# Patient Record
Sex: FEMALE | Race: BLACK OR AFRICAN AMERICAN | NOT HISPANIC OR LATINO | URBAN - METROPOLITAN AREA
[De-identification: names, ages, dates, MRNs, and addresses within clinical notes are randomized per-mention and may not be internally consistent; named-entity substitution may affect disease eponyms.]

---

## 2023-09-01 ENCOUNTER — APPOINTMENT (OUTPATIENT)
Dept: PRIMARY CARE | Facility: CLINIC | Age: 63
End: 2023-09-01
Payer: COMMERCIAL

## 2023-12-12 ENCOUNTER — APPOINTMENT (OUTPATIENT)
Dept: PRIMARY CARE | Facility: CLINIC | Age: 63
End: 2023-12-12
Payer: COMMERCIAL

## 2024-02-28 ENCOUNTER — CLINICAL SUPPORT (OUTPATIENT)
Dept: EMERGENCY MEDICINE | Facility: HOSPITAL | Age: 64
End: 2024-02-28
Payer: COMMERCIAL

## 2024-02-28 ENCOUNTER — APPOINTMENT (OUTPATIENT)
Dept: RADIOLOGY | Facility: HOSPITAL | Age: 64
End: 2024-02-28
Payer: COMMERCIAL

## 2024-02-28 ENCOUNTER — HOSPITAL ENCOUNTER (EMERGENCY)
Facility: HOSPITAL | Age: 64
Discharge: HOME | End: 2024-02-28
Attending: EMERGENCY MEDICINE
Payer: COMMERCIAL

## 2024-02-28 VITALS
HEART RATE: 87 BPM | TEMPERATURE: 97.4 F | RESPIRATION RATE: 20 BRPM | BODY MASS INDEX: 35.49 KG/M2 | SYSTOLIC BLOOD PRESSURE: 156 MMHG | WEIGHT: 262 LBS | OXYGEN SATURATION: 98 % | HEIGHT: 72 IN | DIASTOLIC BLOOD PRESSURE: 72 MMHG

## 2024-02-28 DIAGNOSIS — R21 RASH: Primary | ICD-10-CM

## 2024-02-28 DIAGNOSIS — J44.9 CHRONIC OBSTRUCTIVE PULMONARY DISEASE, UNSPECIFIED COPD TYPE (MULTI): ICD-10-CM

## 2024-02-28 LAB
ALBUMIN SERPL BCP-MCNC: 4.1 G/DL (ref 3.4–5)
ALP SERPL-CCNC: 68 U/L (ref 33–136)
ALT SERPL W P-5'-P-CCNC: 12 U/L (ref 7–45)
AMPHETAMINES UR QL SCN: NORMAL
ANION GAP BLDV CALCULATED.4IONS-SCNC: 9 MMOL/L (ref 10–25)
ANION GAP SERPL CALC-SCNC: 18 MMOL/L (ref 10–20)
APAP SERPL-MCNC: <10 UG/ML
APPEARANCE UR: CLEAR
AST SERPL W P-5'-P-CCNC: 11 U/L (ref 9–39)
ATRIAL RATE: 82 BPM
BARBITURATES UR QL SCN: NORMAL
BASE EXCESS BLDV CALC-SCNC: 6.5 MMOL/L (ref -2–3)
BASOPHILS # BLD AUTO: 0.03 X10*3/UL (ref 0–0.1)
BASOPHILS NFR BLD AUTO: 0.7 %
BENZODIAZ UR QL SCN: NORMAL
BILIRUB SERPL-MCNC: 0.7 MG/DL (ref 0–1.2)
BILIRUB UR STRIP.AUTO-MCNC: NEGATIVE MG/DL
BODY TEMPERATURE: 37 DEGREES CELSIUS
BUN SERPL-MCNC: 15 MG/DL (ref 6–23)
BZE UR QL SCN: NORMAL
CA-I BLDV-SCNC: 1.11 MMOL/L (ref 1.1–1.33)
CALCIUM SERPL-MCNC: 9.4 MG/DL (ref 8.6–10.6)
CANNABINOIDS UR QL SCN: NORMAL
CARDIAC TROPONIN I PNL SERPL HS: 13 NG/L (ref 0–34)
CHLORIDE BLDV-SCNC: 100 MMOL/L (ref 98–107)
CHLORIDE SERPL-SCNC: 100 MMOL/L (ref 98–107)
CO2 SERPL-SCNC: 28 MMOL/L (ref 21–32)
COLOR UR: ABNORMAL
CREAT SERPL-MCNC: 0.8 MG/DL (ref 0.5–1.05)
EGFRCR SERPLBLD CKD-EPI 2021: 82 ML/MIN/1.73M*2
EOSINOPHIL # BLD AUTO: 0.12 X10*3/UL (ref 0–0.7)
EOSINOPHIL NFR BLD AUTO: 2.6 %
ERYTHROCYTE [DISTWIDTH] IN BLOOD BY AUTOMATED COUNT: 13.8 % (ref 11.5–14.5)
ETHANOL SERPL-MCNC: <10 MG/DL
FENTANYL+NORFENTANYL UR QL SCN: NORMAL
FLUAV RNA RESP QL NAA+PROBE: NOT DETECTED
FLUBV RNA RESP QL NAA+PROBE: NOT DETECTED
GLUCOSE BLDV-MCNC: 116 MG/DL (ref 74–99)
GLUCOSE SERPL-MCNC: 100 MG/DL (ref 74–99)
GLUCOSE UR STRIP.AUTO-MCNC: NORMAL MG/DL
HCO3 BLDV-SCNC: 31.9 MMOL/L (ref 22–26)
HCT VFR BLD AUTO: 45.9 % (ref 36–46)
HCT VFR BLD EST: 48 % (ref 36–46)
HGB BLD-MCNC: 15.4 G/DL (ref 12–16)
HGB BLDV-MCNC: 16 G/DL (ref 12–16)
HOLD SPECIMEN: NORMAL
IMM GRANULOCYTES # BLD AUTO: 0.03 X10*3/UL (ref 0–0.7)
IMM GRANULOCYTES NFR BLD AUTO: 0.7 % (ref 0–0.9)
KETONES UR STRIP.AUTO-MCNC: NEGATIVE MG/DL
LACTATE BLDV-SCNC: 2.2 MMOL/L (ref 0.4–2)
LEUKOCYTE ESTERASE UR QL STRIP.AUTO: NEGATIVE
LYMPHOCYTES # BLD AUTO: 1.6 X10*3/UL (ref 1.2–4.8)
LYMPHOCYTES NFR BLD AUTO: 34.8 %
MCH RBC QN AUTO: 29.3 PG (ref 26–34)
MCHC RBC AUTO-ENTMCNC: 33.6 G/DL (ref 32–36)
MCV RBC AUTO: 87 FL (ref 80–100)
MONOCYTES # BLD AUTO: 0.45 X10*3/UL (ref 0.1–1)
MONOCYTES NFR BLD AUTO: 9.8 %
NEUTROPHILS # BLD AUTO: 2.37 X10*3/UL (ref 1.2–7.7)
NEUTROPHILS NFR BLD AUTO: 51.4 %
NITRITE UR QL STRIP.AUTO: NEGATIVE
NRBC BLD-RTO: 0 /100 WBCS (ref 0–0)
OPIATES UR QL SCN: NORMAL
OXYCODONE+OXYMORPHONE UR QL SCN: NORMAL
OXYHGB MFR BLDV: 80.2 % (ref 45–75)
P AXIS: 65 DEGREES
P OFFSET: 200 MS
P ONSET: 142 MS
PCO2 BLDV: 47 MM HG (ref 41–51)
PCP UR QL SCN: NORMAL
PH BLDV: 7.44 PH (ref 7.33–7.43)
PH UR STRIP.AUTO: 6.5 [PH]
PLATELET # BLD AUTO: 204 X10*3/UL (ref 150–450)
PO2 BLDV: 55 MM HG (ref 35–45)
POTASSIUM BLDV-SCNC: 3.6 MMOL/L (ref 3.5–5.3)
POTASSIUM SERPL-SCNC: 3.8 MMOL/L (ref 3.5–5.3)
PR INTERVAL: 168 MS
PROT SERPL-MCNC: 6.5 G/DL (ref 6.4–8.2)
PROT UR STRIP.AUTO-MCNC: ABNORMAL MG/DL
Q ONSET: 226 MS
QRS COUNT: 14 BEATS
QRS DURATION: 90 MS
QT INTERVAL: 410 MS
QTC CALCULATION(BAZETT): 479 MS
QTC FREDERICIA: 455 MS
R AXIS: -11 DEGREES
RBC # BLD AUTO: 5.25 X10*6/UL (ref 4–5.2)
RBC # UR STRIP.AUTO: NEGATIVE /UL
RBC #/AREA URNS AUTO: NORMAL /HPF
SALICYLATES SERPL-MCNC: <3 MG/DL
SAO2 % BLDV: 86 % (ref 45–75)
SARS-COV-2 RNA RESP QL NAA+PROBE: NOT DETECTED
SODIUM BLDV-SCNC: 137 MMOL/L (ref 136–145)
SODIUM SERPL-SCNC: 142 MMOL/L (ref 136–145)
SP GR UR STRIP.AUTO: 1.01
SQUAMOUS #/AREA URNS AUTO: NORMAL /HPF
T AXIS: 29 DEGREES
T OFFSET: 431 MS
UROBILINOGEN UR STRIP.AUTO-MCNC: NORMAL MG/DL
VENTRICULAR RATE: 82 BPM
WBC # BLD AUTO: 4.6 X10*3/UL (ref 4.4–11.3)
WBC #/AREA URNS AUTO: NORMAL /HPF

## 2024-02-28 PROCEDURE — 99222 1ST HOSP IP/OBS MODERATE 55: CPT

## 2024-02-28 PROCEDURE — 2500000004 HC RX 250 GENERAL PHARMACY W/ HCPCS (ALT 636 FOR OP/ED): Mod: SE

## 2024-02-28 PROCEDURE — 81001 URINALYSIS AUTO W/SCOPE: CPT | Mod: 59

## 2024-02-28 PROCEDURE — 84075 ASSAY ALKALINE PHOSPHATASE: CPT

## 2024-02-28 PROCEDURE — 93010 ELECTROCARDIOGRAM REPORT: CPT | Performed by: EMERGENCY MEDICINE

## 2024-02-28 PROCEDURE — 71045 X-RAY EXAM CHEST 1 VIEW: CPT | Mod: FOREIGN READ | Performed by: RADIOLOGY

## 2024-02-28 PROCEDURE — 71045 X-RAY EXAM CHEST 1 VIEW: CPT

## 2024-02-28 PROCEDURE — 85025 COMPLETE CBC W/AUTO DIFF WBC: CPT

## 2024-02-28 PROCEDURE — 87636 SARSCOV2 & INF A&B AMP PRB: CPT | Performed by: EMERGENCY MEDICINE

## 2024-02-28 PROCEDURE — 84132 ASSAY OF SERUM POTASSIUM: CPT

## 2024-02-28 PROCEDURE — 99285 EMERGENCY DEPT VISIT HI MDM: CPT

## 2024-02-28 PROCEDURE — 2500000001 HC RX 250 WO HCPCS SELF ADMINISTERED DRUGS (ALT 637 FOR MEDICARE OP): Mod: SE

## 2024-02-28 PROCEDURE — 80307 DRUG TEST PRSMV CHEM ANLYZR: CPT

## 2024-02-28 PROCEDURE — 99284 EMERGENCY DEPT VISIT MOD MDM: CPT | Mod: 25

## 2024-02-28 PROCEDURE — 93005 ELECTROCARDIOGRAM TRACING: CPT

## 2024-02-28 PROCEDURE — 84484 ASSAY OF TROPONIN QUANT: CPT | Performed by: EMERGENCY MEDICINE

## 2024-02-28 PROCEDURE — 36415 COLL VENOUS BLD VENIPUNCTURE: CPT

## 2024-02-28 PROCEDURE — 80143 DRUG ASSAY ACETAMINOPHEN: CPT

## 2024-02-28 RX ORDER — PREDNISONE 20 MG/1
40 TABLET ORAL ONCE
Status: COMPLETED | OUTPATIENT
Start: 2024-02-28 | End: 2024-02-28

## 2024-02-28 RX ORDER — TRIAMCINOLONE ACETONIDE 1 MG/G
1 CREAM TOPICAL 2 TIMES DAILY
Qty: 2 G | Refills: 0 | Status: SHIPPED | OUTPATIENT
Start: 2024-02-28 | End: 2024-03-06

## 2024-02-28 RX ORDER — ACETAMINOPHEN 325 MG/1
650 TABLET ORAL ONCE
Status: COMPLETED | OUTPATIENT
Start: 2024-02-28 | End: 2024-02-28

## 2024-02-28 RX ORDER — TRIAMCINOLONE ACETONIDE 1 MG/G
CREAM TOPICAL 2 TIMES DAILY
Status: DISCONTINUED | OUTPATIENT
Start: 2024-02-28 | End: 2024-02-28 | Stop reason: HOSPADM

## 2024-02-28 RX ADMIN — TRIAMCINOLONE ACETONIDE: 1 CREAM TOPICAL at 11:17

## 2024-02-28 RX ADMIN — PREDNISONE 40 MG: 20 TABLET ORAL at 11:17

## 2024-02-28 RX ADMIN — ACETAMINOPHEN 650 MG: 325 TABLET ORAL at 11:17

## 2024-02-28 ASSESSMENT — COLUMBIA-SUICIDE SEVERITY RATING SCALE - C-SSRS
1. IN THE PAST MONTH, HAVE YOU WISHED YOU WERE DEAD OR WISHED YOU COULD GO TO SLEEP AND NOT WAKE UP?: NO
6. HAVE YOU EVER DONE ANYTHING, STARTED TO DO ANYTHING, OR PREPARED TO DO ANYTHING TO END YOUR LIFE?: NO
2. HAVE YOU ACTUALLY HAD ANY THOUGHTS OF KILLING YOURSELF?: YES
5. HAVE YOU STARTED TO WORK OUT OR WORKED OUT THE DETAILS OF HOW TO KILL YOURSELF? DO YOU INTEND TO CARRY OUT THIS PLAN?: YES
4. HAVE YOU HAD THESE THOUGHTS AND HAD SOME INTENTION OF ACTING ON THEM?: YES

## 2024-02-28 ASSESSMENT — LIFESTYLE VARIABLES
EVER FELT BAD OR GUILTY ABOUT YOUR DRINKING: NO
HAVE PEOPLE ANNOYED YOU BY CRITICIZING YOUR DRINKING: NO
EVER HAD A DRINK FIRST THING IN THE MORNING TO STEADY YOUR NERVES TO GET RID OF A HANGOVER: NO
HAVE YOU EVER FELT YOU SHOULD CUT DOWN ON YOUR DRINKING: NO

## 2024-02-28 ASSESSMENT — PAIN DESCRIPTION - PROGRESSION: CLINICAL_PROGRESSION: NOT CHANGED

## 2024-02-28 ASSESSMENT — PAIN SCALES - GENERAL: PAINLEVEL_OUTOF10: 0 - NO PAIN

## 2024-02-28 ASSESSMENT — PAIN - FUNCTIONAL ASSESSMENT: PAIN_FUNCTIONAL_ASSESSMENT: 0-10

## 2024-02-28 NOTE — ED TRIAGE NOTES
"Pt presents to ED with chief complaint of \"feeling down.\" Pt has a Hx of schizophrenia and says she was feeling suicidal with the plan to \"bleed out.\" Pt did not act on this plan and says she feels better now. Pt denies HI at this time. Pt says she sometimes has AH/VH, but not currently. Pt would like her monthly \"psych shot\" early, and would then like to go home. Pt also has a medical history of COPD.  "

## 2024-02-28 NOTE — DISCHARGE INSTRUCTIONS
If you develop any new or worsening symptoms, please return to the emergency department immediately.  Please follow-up with your primary care provider and your other providers.  Please use your steroid cream for that rash.  If you feel like it is worsening please return to the emergency department.

## 2024-02-28 NOTE — ED PROVIDER NOTES
"HPI   Chief Complaint   Patient presents with    Psychiatric Evaluation     Pt presents to ED with chief complaint of \"feeling down.\" Pt has a Hx of schizophrenia and says she was feeling suicidal with the plan to \"bleed out.\" Pt did not act on this plan and says she feels better now. Pt denies HI at this time. Pt says she sometimes has AH/VH, but not currently. Pt would like her monthly \"psych shot\" early, and would then like to go home. Pt also has a medical history of COPD.       Patient is a 63-year-old female with history of schizophrenia and COPD who presents with complaint of \"feeling down\".  Patient states that she has been dealing with a rash on the right side of her neck, states that this has been going on for a day or 2, states this feels consistent with her eczema.  States that this rash has been itching, causing her to not get as much sleep.  Patient states due to other stressors and due to this, she had thoughts of suicide.  However, patient states that she only thought that because she does not like to be in discomfort.  States that she has gotten her antipsychotic shot as required.  Denies any audiovisual hallucinations.  Denies any homicidal ideations.  States that her plan was to \"bleed out\", but has not attempted this.  States that she would like to get medication for her rash and go home.  Also states that since she is here, she would like to get her shot that she is due for next week.  States she was given a breathing treatment while in the ambulance, because she stated that she felt like her COPD was acting out, but denies any shortness of breath currently.  Denies any chest pain, weakness, dizziness, nausea, vomiting abdominal pain, fevers, chills.                          [place] Coma Scale Score: 15                     Patient History   Past Medical History:   Diagnosis Date    Contact with and (suspected) exposure to infections with a predominantly sexual mode of transmission 04/03/2017    " Exposure to STD    Encounter for screening for infections with a predominantly sexual mode of transmission     Screening examination for STD (sexually transmitted disease)    Personal history of diseases of the skin and subcutaneous tissue 03/15/2016    History of folliculitis    Personal history of other diseases of the digestive system 01/16/2018    History of constipation    Personal history of other diseases of the nervous system and sense organs 07/07/2020    History of conjunctivitis    Personal history of other diseases of the respiratory system 02/16/2016    History of acute bronchitis    Personal history of other diseases of the respiratory system 03/06/2017    History of acute bronchitis    Personal history of other specified conditions 08/30/2018    History of diarrhea    Personal history of other specified conditions 03/23/2020    History of abdominal pain    Unspecified abdominal pain 10/12/2017    Abdominal wall pain     Past Surgical History:   Procedure Laterality Date    OTHER SURGICAL HISTORY  10/23/2017    Enterectomy     No family history on file.  Social History     Tobacco Use    Smoking status: Not on file    Smokeless tobacco: Not on file   Substance Use Topics    Alcohol use: Not on file    Drug use: Not on file       Physical Exam   ED Triage Vitals [02/28/24 1005]   Temperature Heart Rate Respirations BP   36.3 °C (97.4 °F) 76 20 162/74      Pulse Ox Temp Source Heart Rate Source Patient Position   98 % Oral -- --      BP Location FiO2 (%)     -- --       Physical Exam  Vitals and nursing note reviewed.   Constitutional:       Appearance: Normal appearance.   HENT:      Head: Normocephalic and atraumatic.      Right Ear: External ear normal.      Left Ear: External ear normal.      Nose: Nose normal.      Mouth/Throat:      Mouth: Mucous membranes are moist.      Pharynx: Oropharynx is clear.   Eyes:      Extraocular Movements: Extraocular movements intact.      Conjunctiva/sclera:  Conjunctivae normal.      Pupils: Pupils are equal, round, and reactive to light.   Cardiovascular:      Rate and Rhythm: Normal rate and regular rhythm.      Pulses: Normal pulses.      Heart sounds: Normal heart sounds.   Pulmonary:      Effort: Pulmonary effort is normal. No respiratory distress.      Breath sounds: Normal breath sounds. No wheezing.   Abdominal:      General: Abdomen is flat. Bowel sounds are normal.      Palpations: Abdomen is soft.      Tenderness: There is no abdominal tenderness. There is no guarding or rebound.   Musculoskeletal:         General: No deformity. Normal range of motion.      Cervical back: Normal range of motion and neck supple. No tenderness.      Right lower leg: No edema.      Left lower leg: No edema.   Skin:     General: Skin is warm and dry.      Capillary Refill: Capillary refill takes less than 2 seconds.      Findings: Rash present.      Comments: papular rash on the right posterior neck, no tenderness to palpation, no streaking, no swelling.   Neurological:      General: No focal deficit present.      Mental Status: She is alert and oriented to person, place, and time. Mental status is at baseline.   Psychiatric:         Mood and Affect: Mood normal.         Behavior: Behavior normal.         Thought Content: Thought content normal.         Judgment: Judgment normal.         ED Course & MDM   Diagnoses as of 02/29/24 0905   Rash   Chronic obstructive pulmonary disease, unspecified COPD type (CMS/HCC)       Medical Decision Making  Patient is a 63-year-old female who presents with thoughts of self-harm overnight.  Patient also reports pruritic rash on her neck.  Patient states she has tried Vaseline but that has not helped.  Denies any hallucinations or homicidal ideation.  Patient states that she was just feeling bad due to the pruritus, but would like to get treatment and go home.  Does follow for her antipsychotic injections.  On exam patient is well-appearing,  afebrile hemodynamically stable.  Patient does have a rash throughout the posterior of her neck on the right side, does appear eczematous versus dermatitis.  No streaking or tenderness to palpation or swelling, low suspicion for cellulitis, as there is no surrounding erythema either.  It is not dermatomal, does not look like shingles.  Will give the patient triamcinolone, also given prednisone, due to the patient's COPD history as well as the rash.  Will get EPAT labs, but will also add on a troponin/chest x-ray due to the patient's complaint of shortness of breath, although she has no shortness of breath at this time.  Lungs do sound clear to auscultation.  No respiratory difficulties.  Attending physician did order a blood gas due to the patient's complaints of an episode of shortness of breath, lactate slightly elevated, but did have a breathing treatment prior to that.  Low suspicion for any acute abnormalities.      Patient's x-ray was negative, but did ask for correlation for fluid overload.  Patient denies any current shortness of breath, does not have any clinical presentations of fluid overload.  Lungs do sound clear to auscultation.  Discussed with the patient, she again states she does not feel short of breath at this time.  She will follow-up with primary care provider for this issue.  Or she return to the emergency department if issue gets worse. After steroid and steroid cream, patient feeling improved, did ask for discharge.  Explained that EPAT was going to evaluate her, and she verbalized understanding and stated she would patiently wait.  EPAT did see the patient, do not believe she qualifies for inpatient services at this time.  Patient does not exhibit suicidal behavior, states that she does not want to kill herself.  Patient states she feels improved, would like to be discharged.  Patient does have very good follow-up as per our EMR chart review, EPAT agreed with assessment.  Patient was  encouraged to continue outpatient resources, which she stated she would.  Given very strict return precautions.    Patient was instructed to follow up with their primary care doctor and to return to the ED if their symptoms return or worsen. Patient verbalized understanding of ED return precautions and follow up. All discharge instructions explained to patient and all questions answered. Patient agreeable to discharge and plan of action. Patient stable on discharge.        Procedure  Procedures    Arley Dahl PA-C  02/28/24 1354       Arley Dahl PA-C  02/28/24 1413    Attending Note:  The patient was seen by the resident/fellow/CATA.  I have personally performed a substantive portion of the encounter.  I have seen and examined the patient; agree with the workup, evaluation, MDM, management and diagnosis with the exception/addition of the following.  The care plan has been discussed with the resident/fellow; I have reviewed the resident/fellow’s note and agree with the documented findings with the exception/addition of the following:       HPI:   Kandy Finn is a 63 year old female with history of HTN, HLD, COPD/asthma, eczema, migraines, GERD, schizophrenia (on Prolixin 50 mg [id number], last dose 2/14, Cogentin 2 mg QHS), PTSD, anxiety, tobacco use (50+ pack year history), and remote history of ETOH dependency presenting to the ED for evaluation. Per chart review, seen by telemedicine for cough 2/17/23. Diagnosed with viral URI and chronic bronchitis per notes. Prescribed Tessalon Perles and albuterol nebulizer solution.  Follows with [facility]. The patient presented today to the ED for depressed mood. She reports history of schizophrenia and noted feeling suicidal earlier today with plan to bleed out.  She reports remote history of attempting to cut herself.  She denies any self-injurious behavior today or recently, and reports feeling better at present. She denies HI.  She reports intermittent AVH  chronically, but denies current symptoms. She reports she will hear various sounds, which she cannot elaborate upon further at present, and see herself in a casket. She notes that she has a history of eczema and she has experienced a pruritic rash to her neck and shoulder which is consistent with prior eczema.  No vesicular lesions, paresthesia, burning pain, or history of shingles. Reports that she does use various soaps at home, and has sometimes had a reaction to them previously. No oral/mucosal involvement. No new jewelry, clothing or other exposures. She reports history of COPD but denies cough, chest pain, SOB, palpitations, pleurisy, leg pain, or leg swelling at this time. She reports she felt transient shortness of breath and wheezing while getting into the ambulance. She reports getting a breathing treatment and now symptoms are resolved. She denies recent travel, immobilization, exogenous estrogen, malignancy or prior DVT/PE. She denies rhinorrhea, cough or sore throat. She denies prior admissions for COPD/asthma.    Additional History Obtained from: See HPI       Physical Exam:  General: Grossly well-developed, awake, alert, NAD    Head: Normocephalic, no overt external signs of trauma    ENT: Edentulous. Mucus membranes moist, nares patent    Neck: Supple, trachea midline    Neurologic: A&Ox4, FS, TM, EOMI, PERRL, WALSH x 4 with grossly symmetric strength, 5/5, SILT grossly intact BUE and BLE    Cardiovascular: RRR, pulses grossly symmetric    Respiratory: CTA B/L, no wheeze, rales or rhonchi    Abdomen: Soft, not appreciably tender, no evident rebound/guarding, no pulsatile masses palpable.     Back: No apparent CVA TTP    MSK: No gross bony deformities in BUE and BLE. No calf pain or palpable cords. No edema.     Skin/Integumentary: Warm and dry. Mildly erythematous but blanchable macular papuplar rash over right neck and shoulder, without clear dermatomal distribution or vesicular lesions. No allodynia.      Psychiatric: Calm and cooperative. No obvious signs of responding to internal stimulation. No pressured speech. No signs of disorganized thought process.  No flight of ideas. No signs of bizarre thought content.         EKG Interpretation:  EKG 2/28/24 - 1020- sinus rhythm, rate 82, axis normal, intervals , QRS 90, QTc 479, background artifact limiting interpretation, however, no obvious significant ST elevation or depression. Rate increased from 73 on 4/19/21.    Interpreted by provider as above    Differential Diagnoses Considered:  See MDM below    Chronic Medical Conditions Significantly Affecting Care:  See MDM below    External Records Reviewed:  I reviewed recent and relevant outside records as noted in HPI above and MDM below.     Independent Interpretation of Studies:    Laboratory/Imaging Studies:  Laboratory results personally reviewed and independently interpreted:  CBC without significant anemia, thrombocytopenia or leukocytosis  Metabolic panel without LANI, HAGMA or significant electrolyte anomalies. No transaminitis or hyperbilirubinemia.   APAP, ASA, ETOH not elevated  HS troponin not elevated.    Urine tox negative  UA without obvious signs of UTI, negative LE and nitrites   COVID-19 and influenza negative  VBG 7.44/47/55, no marked lactic acidosis    Radiology imaging and preliminary and/or final reports personally reviewed/independently interpreted:  CXR   IMPRESSION:  Cardiomegaly.  Mild prominence of the interstitium.  Correlate  clinically for component of fluid overload.    Social Determinants of Health Significantly Affecting Care:  See HPI/MDM    Prescription Drug Consideration:  See MDM    Diagnostic testing considered:  See MDM and relevant sections      Medical Decision Making/Course:  63 year old female with history of HTN, HLD, COPD/asthma, eczema, migraines, GERD, schizophrenia (on Prolixin 50 mg [id number], last dose 2/14, Cogentin 2 mg QHS), PTSD, anxiety, tobacco use (50+  "pack year history), and remote history of ETOH dependency presenting to the ED for evaluation. The patient reported SI prior to arrival, with vague thoughts of \"bleeding out.\" She denies current SI, HI or AVH. She is calm and cooperative. She reported feeling briefly short of breath while getting into the ambulance, however, denies any current SOB. No chest pain, orthopnea, or PND. There was no calf swelling/pain, recent long travel/immobilization, malignancy, or major risk factors or history concerning for DVT/PE.  There were no obvious EKG changes concerning for pericarditis.  History and exam less  consistent with myocarditis.  EKG and troponin, as well as history less consistent with ACS.  CXR without signs of pneumothorax, large pneumonia, large effusion, or other acute thoracic process.  Pulses were grossly symmetric and there was no tearing chest pain to the back, or history/exam findings consistent with aortic dissection  No pusatile abdominal mass or signs of ruptured AAA.  CXR with possible mild interstitial prominence, however, patient without LE edema, orthopnea, PND, or history consistent with fluid overload. She was saturating well on RA and ambulated without dyspnea or desaturations. She reported a history of COPD/asthma but denies cough, rhinorrhea or history consistent with PNA.  Given breathing treatment en-route to ED. Lactate borderline elevated at 2.2, however, patient had received a breathing treatment shortly prior. She had no signs of hypoperfusion or sepsis on exam/workup. She did complain of a rash to her neck.  Rash appears consistent with eczema/atopic dermatitis. No signs of oral/muscosal involvement, SJS, TENS or other acute pathology. Rash not in a dermatomal distribution, and without pain or vesicular lesions, and was not consistent with herpes zoster.  No bullous lesions or signs of bullous pemphigoid or pemphigus vulgaris. Given dose of steroid and prescription for topical steroid " creme. The patient was afebrile, non-toxic in appearance and hemodynamically stable.  Neck supple without signs of meningitis. Lungs clear without signs of pneumonia.  No abdominal pain, nausea, vomiting or signs of acute GI process.  No signs of UTI per history/exam/studies.  No clear signs of acute infectious process.  No obvious signs of acute toxicologic or metabolic etiologies.  EPAT/psychiatry consulted and felt patient stable for discharge and did not require inpatient admission. Recommended follow-up as scheduled with her outpatient mental health provider. Strict warning signs/precautions for return to the ED were discussed.  Instructed to follow-up with her outpatient mental health provider and PCP/FP/IM clinic.  The patient was instructed to follow-up as discussed.  All available ED results from this visit were discussed. Any new prescription medications from the ED were discussed including common side effects/instructions for use. They were also instructed to return to the ED immediately if symptoms worsened, failed to improve, or if they developed any new symptoms/concerns.  An opportunity to ask questions was provided and all were addressed at that time.  The patient verbalized understanding and was in agreement with plan.        Ottoniel Brower MD  02/29/24 7948

## 2024-02-28 NOTE — CONSULTS
Consults  Referring Provider  Shubham Dahl PA-C    History Of Present Illness  Kandy Finn is a 63 y.o. female presenting to [facility] ED via EMS for concern of SI earlier/last night, which has since resolved. UDS negative, BAL < 10 mg/dL.      Past Psychiatric History  Schizophrenia  PTSD    Past Medical History  She has a past medical history of Contact with and (suspected) exposure to infections with a predominantly sexual mode of transmission (04/03/2017), Encounter for screening for infections with a predominantly sexual mode of transmission, Personal history of diseases of the skin and subcutaneous tissue (03/15/2016), Personal history of other diseases of the digestive system (01/16/2018), Personal history of other diseases of the nervous system and sense organs (07/07/2020), Personal history of other diseases of the respiratory system (02/16/2016), Personal history of other diseases of the respiratory system (03/06/2017), Personal history of other specified conditions (08/30/2018), Personal history of other specified conditions (03/23/2020), and Unspecified abdominal pain (10/12/2017).    Surgical History  She has a past surgical history that includes Other surgical history (10/23/2017).     Social History  Pt reports currently smoking cigarettes, denies illicit drug use, social alcohol use (last drank yesterday).    Current living situation: alone  Current employment/source of income: [employer]  Born and raised: [place], [place]  Education: 3rd grade  Legal history: denies  Access to weapons: denies    Prior psychiatric hospitalizations: reports over 20 years ago (pt reports history at [facility], , [facility])  Prior rehab/detox: denies  History of suicide attempts:  1 via ingesting drano in the 90s  History of self-harm: cut wrists in her 20s  History of trauma/abuse/loss: 'yes'  History of violence: denies     Current mental health agency: [facility], NP is Amanda Lopez for medications & [facility] for CM &  "counseling     Current psychiatric medications: prolixin 50 mg Q21 days (due 3/5/24), cogentin 2 mg PO at bedtime     OARRS: checked, no data    Family psychiatric history:      - Psychiatric disorders: pt unsure     - Suicide: pt unsure     - Substance use: pt unsure     Allergies  Patient has no allergy information on record.    Review of Systems    Psychiatric ROS - Adult  Anxiety: Negative  Depression: negative  Delirium: negative  Psychosis: negative  Monika: negative  Safety Issues: none    Physical Exam    Mental Status Exam  General: 64 y/o AA female, wearing bonnet, in hospital attire, sitting up in ED cot  Appearance: appears state age, overweight, missing dentition   Attitude: calm, cooperative  Behavior: appropriate eye contact  Motor Activity: gait not assessed, pt reports she uses a cane. Denies PMAR/TD  Speech: appropriate rate, rhythm, volume, tone. Spontaneous  Mood: \"great\"  Affect: full range  Thought Process: linear, logical, goal-directed  Thought Content: denies SI/HI or delusions  Thought Perception: denies AVH, does not appear internally stimulated  Cognition: alert, oriented x 3.   Insight: fair  Judgement: fair    Psychiatric Risk Assessment  Violence Risk Assessment: major mental illness  Acute Risk of Harm to Others is Considered: low   Suicide Risk Assessment: chronic medical illness, history of trauma or abuse, and prior suicide attempt. Pt reports chronic, occasional fleeting SI  Protective Factors against Suicide: adherence to  treatment, hopefulness/future orientation, marriage/partnership, positive family relationships, sense of responsibility toward family, social support/connectedness, strong coping skills, and strong therapeutic alliance with provider  Acute Risk of Harm to Self is Considered: low    Last Recorded Vitals  Blood pressure 162/74, pulse 76, temperature 36.3 °C (97.4 °F), temperature source Oral, resp. rate 20, height 1.88 m (6' 2\"), weight 119 kg (262 lb), SpO2 98 " %.    Relevant Results  Scheduled medications  triamcinolone, , Topical, BID      Continuous medications     PRN medications    Results for orders placed or performed during the hospital encounter of 02/28/24 (from the past 24 hour(s))   Blood Gas Venous Full Panel Unsolicited   Result Value Ref Range    POCT pH, Venous 7.44 (H) 7.33 - 7.43 pH    POCT pCO2, Venous 47 41 - 51 mm Hg    POCT pO2, Venous 55 (H) 35 - 45 mm Hg    POCT SO2, Venous 86 (H) 45 - 75 %    POCT Oxy Hemoglobin, Venous 80.2 (H) 45.0 - 75.0 %    POCT Hematocrit Calculated, Venous 48.0 (H) 36.0 - 46.0 %    POCT Sodium, Venous 137 136 - 145 mmol/L    POCT Potassium, Venous 3.6 3.5 - 5.3 mmol/L    POCT Chloride, Venous 100 98 - 107 mmol/L    POCT Ionized Calicum, Venous 1.11 1.10 - 1.33 mmol/L    POCT Glucose, Venous 116 (H) 74 - 99 mg/dL    POCT Lactate, Venous 2.2 (H) 0.4 - 2.0 mmol/L    POCT Base Excess, Venous 6.5 (H) -2.0 - 3.0 mmol/L    POCT HCO3 Calculated, Venous 31.9 (H) 22.0 - 26.0 mmol/L    POCT Hemoglobin, Venous 16.0 12.0 - 16.0 g/dL    POCT Anion Gap, Venous 9.0 (L) 10.0 - 25.0 mmol/L    Patient Temperature 37.0 degrees Celsius   CBC and Auto Differential   Result Value Ref Range    WBC 4.6 4.4 - 11.3 x10*3/uL    nRBC 0.0 0.0 - 0.0 /100 WBCs    RBC 5.25 (H) 4.00 - 5.20 x10*6/uL    Hemoglobin 15.4 12.0 - 16.0 g/dL    Hematocrit 45.9 36.0 - 46.0 %    MCV 87 80 - 100 fL    MCH 29.3 26.0 - 34.0 pg    MCHC 33.6 32.0 - 36.0 g/dL    RDW 13.8 11.5 - 14.5 %    Platelets 204 150 - 450 x10*3/uL    Neutrophils % 51.4 40.0 - 80.0 %    Immature Granulocytes %, Automated 0.7 0.0 - 0.9 %    Lymphocytes % 34.8 13.0 - 44.0 %    Monocytes % 9.8 2.0 - 10.0 %    Eosinophils % 2.6 0.0 - 6.0 %    Basophils % 0.7 0.0 - 2.0 %    Neutrophils Absolute 2.37 1.20 - 7.70 x10*3/uL    Immature Granulocytes Absolute, Automated 0.03 0.00 - 0.70 x10*3/uL    Lymphocytes Absolute 1.60 1.20 - 4.80 x10*3/uL    Monocytes Absolute 0.45 0.10 - 1.00 x10*3/uL    Eosinophils  Absolute 0.12 0.00 - 0.70 x10*3/uL    Basophils Absolute 0.03 0.00 - 0.10 x10*3/uL   Comprehensive Metabolic Panel   Result Value Ref Range    Glucose 100 (H) 74 - 99 mg/dL    Sodium 142 136 - 145 mmol/L    Potassium 3.8 3.5 - 5.3 mmol/L    Chloride 100 98 - 107 mmol/L    Bicarbonate 28 21 - 32 mmol/L    Anion Gap 18 10 - 20 mmol/L    Urea Nitrogen 15 6 - 23 mg/dL    Creatinine 0.80 0.50 - 1.05 mg/dL    eGFR 82 >60 mL/min/1.73m*2    Calcium 9.4 8.6 - 10.6 mg/dL    Albumin 4.1 3.4 - 5.0 g/dL    Alkaline Phosphatase 68 33 - 136 U/L    Total Protein 6.5 6.4 - 8.2 g/dL    AST 11 9 - 39 U/L    Bilirubin, Total 0.7 0.0 - 1.2 mg/dL    ALT 12 7 - 45 U/L   Acute Toxicology Panel, Blood   Result Value Ref Range    Acetaminophen <10.0 10.0 - 30.0 ug/mL    Salicylate  <3 4 - 20 mg/dL    Alcohol <10 <=10 mg/dL   Troponin I, High Sensitivity   Result Value Ref Range    Troponin I, High Sensitivity 13 0 - 34 ng/L   Lavender Top   Result Value Ref Range    Extra Tube Hold for add-ons.    SST TOP   Result Value Ref Range    Extra Tube Hold for add-ons.    Drug Screen, Urine   Result Value Ref Range    Amphetamine Screen, Urine Presumptive Negative Presumptive Negative    Barbiturate Screen, Urine Presumptive Negative Presumptive Negative    Benzodiazepines Screen, Urine Presumptive Negative Presumptive Negative    Cannabinoid Screen, Urine Presumptive Negative Presumptive Negative    Cocaine Metabolite Screen, Urine Presumptive Negative Presumptive Negative    Fentanyl Screen, Urine Presumptive Negative Presumptive Negative    Opiate Screen, Urine Presumptive Negative Presumptive Negative    Oxycodone Screen, Urine Presumptive Negative Presumptive Negative    PCP Screen, Urine Presumptive Negative Presumptive Negative   Urinalysis with Reflex Culture and Microscopic   Result Value Ref Range    Color, Urine Light-Yellow Light-Yellow, Yellow, Dark-Yellow    Appearance, Urine Clear Clear    Specific Gravity, Urine 1.011 1.005 - 1.035  "   pH, Urine 6.5 5.0, 5.5, 6.0, 6.5, 7.0, 7.5, 8.0    Protein, Urine 30 (1+) (A) NEGATIVE, 10 (TRACE), 20 (TRACE) mg/dL    Glucose, Urine Normal Normal mg/dL    Blood, Urine NEGATIVE NEGATIVE    Ketones, Urine NEGATIVE NEGATIVE mg/dL    Bilirubin, Urine NEGATIVE NEGATIVE    Urobilinogen, Urine Normal Normal mg/dL    Nitrite, Urine NEGATIVE NEGATIVE    Leukocyte Esterase, Urine NEGATIVE NEGATIVE   Urinalysis Microscopic   Result Value Ref Range    WBC, Urine 1-5 1-5, NONE /HPF    RBC, Urine 1-2 NONE, 1-2, 3-5 /HPF    Squamous Epithelial Cells, Urine 1-9 (SPARSE) Reference range not established. /HPF   Sars-CoV-2 and Influenza A/B PCR   Result Value Ref Range    Flu A Result Not Detected Not Detected    Flu B Result Not Detected Not Detected    Coronavirus 2019, PCR Not Detected Not Detected          Assessment/Plan     [place]andy [place] [facility] [place] concern of SI after talking to her sister today. The pt reports 2 days of a rash that is painful & itchy on the right side of her neck; \"I wore synthetic hair & I reacted to it\". Due to the discomfort from the rash the pt could not sleep & then due to not sleeping her stomach hurt & the pt felt suicidal with thoughts of \"bleeding out\". She told her sister this today & decided to come to the ED for the SI & her rash.    Pt is very well connected in the community per chart review & pt interview. She is knowledgeable about her care, including dates & doses of medications. She receives case management & counseling at [facility] & she was able to not act on the fleeting SI by using her learned coping skills. [facility]. The pt reports occasional fleeting SI are not new for her, \"But I've learned to live & deal with them\". She reports no self-harm or suicide attempts in over 20 years. She is compliant with her prolixin injection every 3 weeks; is due for infection on 3/5/24. She missed her appointment with her PMHNP today due to being in the " "ED. She has 3 children, 18 grand kids & 6 great grand kids & her  as protective factors. She enjoys babysitting her great-grandchildren \"to keep me busy & active.\" The pt did have a rum & coke yesterday, \"I usually don't drink & I think it made me emotional. I'm not much of a drinker & I know it's not good for me.\"     Pt is not presenting as an elevated risk of harm to herself or others. She is presenting at her documented psychiatric baseline & is not decompensated/nor requiring an inpatient level of care at this time. She is appropriate to continue her treatment outpatient at this time.     Impression  Schizophrenia by history, no acute decompensation  PTSD      Recommendations  Following a chart review, safety risk assessment, interview with pt and ED staff, pt does not meet criteria for involuntary inpatient psychiatric hospitalization at this time. I am not recommending any medication management changes. Please encourage pt to follow-up with outpatient provider.     I discussed these recommendations with the current ED provider (Shubham Dahl PA-C) who was in agreement with above plan of care.        I spent 60 minutes in the professional and overall care of this patient.      Medication Consent  Medication Consent: n/a; consult service    Renetta Parson, APRN-CNP        "

## 2024-04-15 ENCOUNTER — LAB (OUTPATIENT)
Dept: LAB | Facility: LAB | Age: 64
End: 2024-04-15
Payer: COMMERCIAL

## 2024-04-15 ENCOUNTER — OFFICE VISIT (OUTPATIENT)
Dept: PRIMARY CARE | Facility: CLINIC | Age: 64
End: 2024-04-15
Payer: COMMERCIAL

## 2024-04-15 VITALS
SYSTOLIC BLOOD PRESSURE: 153 MMHG | DIASTOLIC BLOOD PRESSURE: 93 MMHG | TEMPERATURE: 98.3 F | WEIGHT: 274.6 LBS | HEART RATE: 73 BPM | BODY MASS INDEX: 35.26 KG/M2

## 2024-04-15 DIAGNOSIS — F20.0 PARANOID SCHIZOPHRENIA (MULTI): ICD-10-CM

## 2024-04-15 DIAGNOSIS — I10 PRIMARY HYPERTENSION: ICD-10-CM

## 2024-04-15 DIAGNOSIS — R73.03 PREDIABETES: ICD-10-CM

## 2024-04-15 DIAGNOSIS — K21.9 GASTROESOPHAGEAL REFLUX DISEASE WITHOUT ESOPHAGITIS: ICD-10-CM

## 2024-04-15 DIAGNOSIS — Z12.31 ENCOUNTER FOR SCREENING MAMMOGRAM FOR MALIGNANT NEOPLASM OF BREAST: ICD-10-CM

## 2024-04-15 DIAGNOSIS — J84.10 PULMONARY FIBROSIS, UNSPECIFIED (MULTI): ICD-10-CM

## 2024-04-15 DIAGNOSIS — R10.13 EPIGASTRIC PAIN: ICD-10-CM

## 2024-04-15 DIAGNOSIS — Z12.11 COLON CANCER SCREENING: Primary | ICD-10-CM

## 2024-04-15 DIAGNOSIS — Z00.00 ANNUAL PHYSICAL EXAM: ICD-10-CM

## 2024-04-15 DIAGNOSIS — G47.33 OBSTRUCTIVE SLEEP APNEA (ADULT) (PEDIATRIC): Chronic | ICD-10-CM

## 2024-04-15 PROBLEM — F17.200 NICOTINE DEPENDENCE: Status: ACTIVE | Noted: 2018-09-27

## 2024-04-15 PROBLEM — K59.00 CONSTIPATION: Status: ACTIVE | Noted: 2023-02-17

## 2024-04-15 PROBLEM — R19.7 POSTCHOLECYSTECTOMY DIARRHEA: Status: ACTIVE | Noted: 2024-04-15

## 2024-04-15 PROBLEM — M17.0 BILATERAL PRIMARY OSTEOARTHRITIS OF KNEE: Status: ACTIVE | Noted: 2022-05-08

## 2024-04-15 PROBLEM — M25.561 BILATERAL KNEE PAIN: Status: ACTIVE | Noted: 2024-04-15

## 2024-04-15 PROBLEM — H04.123 BILATERAL DRY EYES: Status: ACTIVE | Noted: 2024-04-15

## 2024-04-15 PROBLEM — M54.16 LUMBAR RADICULOPATHY, CHRONIC: Status: RESOLVED | Noted: 2024-04-15 | Resolved: 2024-04-15

## 2024-04-15 PROBLEM — L20.9 ATOPIC DERMATITIS, UNSPECIFIED: Status: ACTIVE | Noted: 2020-08-21

## 2024-04-15 PROBLEM — G89.29 CHRONIC LOW BACK PAIN: Status: ACTIVE | Noted: 2024-04-15

## 2024-04-15 PROBLEM — M54.50 CHRONIC LOW BACK PAIN: Status: ACTIVE | Noted: 2024-04-15

## 2024-04-15 PROBLEM — N32.81 OVERACTIVE BLADDER: Status: ACTIVE | Noted: 2022-07-05

## 2024-04-15 PROBLEM — K91.89 POSTCHOLECYSTECTOMY DIARRHEA: Status: ACTIVE | Noted: 2024-04-15

## 2024-04-15 PROBLEM — J30.9 ALLERGIC RHINITIS: Status: ACTIVE | Noted: 2024-04-15

## 2024-04-15 PROBLEM — M25.562 BILATERAL KNEE PAIN: Status: ACTIVE | Noted: 2024-04-15

## 2024-04-15 PROBLEM — K58.9 IRRITABLE BOWEL SYNDROME: Status: ACTIVE | Noted: 2022-11-03

## 2024-04-15 LAB
ERYTHROCYTE [DISTWIDTH] IN BLOOD BY AUTOMATED COUNT: 13.9 % (ref 11.5–14.5)
EST. AVERAGE GLUCOSE BLD GHB EST-MCNC: 123 MG/DL
HBA1C MFR BLD: 5.9 %
HCT VFR BLD AUTO: 45.4 % (ref 36–46)
HGB BLD-MCNC: 14.1 G/DL (ref 12–16)
MCH RBC QN AUTO: 29.1 PG (ref 26–34)
MCHC RBC AUTO-ENTMCNC: 31.1 G/DL (ref 32–36)
MCV RBC AUTO: 94 FL (ref 80–100)
NRBC BLD-RTO: 0 /100 WBCS (ref 0–0)
PLATELET # BLD AUTO: 163 X10*3/UL (ref 150–450)
RBC # BLD AUTO: 4.84 X10*6/UL (ref 4–5.2)
WBC # BLD AUTO: 4.5 X10*3/UL (ref 4.4–11.3)

## 2024-04-15 PROCEDURE — 3080F DIAST BP >= 90 MM HG: CPT | Performed by: INTERNAL MEDICINE

## 2024-04-15 PROCEDURE — 83036 HEMOGLOBIN GLYCOSYLATED A1C: CPT

## 2024-04-15 PROCEDURE — 80053 COMPREHEN METABOLIC PANEL: CPT

## 2024-04-15 PROCEDURE — 99213 OFFICE O/P EST LOW 20 MIN: CPT | Performed by: INTERNAL MEDICINE

## 2024-04-15 PROCEDURE — 3077F SYST BP >= 140 MM HG: CPT | Performed by: INTERNAL MEDICINE

## 2024-04-15 PROCEDURE — 84443 ASSAY THYROID STIM HORMONE: CPT

## 2024-04-15 PROCEDURE — 99396 PREV VISIT EST AGE 40-64: CPT | Performed by: INTERNAL MEDICINE

## 2024-04-15 PROCEDURE — 80061 LIPID PANEL: CPT

## 2024-04-15 PROCEDURE — 36415 COLL VENOUS BLD VENIPUNCTURE: CPT

## 2024-04-15 PROCEDURE — 85027 COMPLETE CBC AUTOMATED: CPT

## 2024-04-15 RX ORDER — ALBUTEROL SULFATE 90 UG/1
2 AEROSOL, METERED RESPIRATORY (INHALATION) EVERY 4 HOURS PRN
COMMUNITY
Start: 2021-10-20 | End: 2024-04-15 | Stop reason: SDUPTHER

## 2024-04-15 RX ORDER — FLUPHENAZINE DECANOATE 25 MG/ML
50 INJECTION, SOLUTION INTRAMUSCULAR; SUBCUTANEOUS
COMMUNITY
Start: 2013-03-28

## 2024-04-15 RX ORDER — FLUTICASONE PROPIONATE AND SALMETEROL 113; 14 UG/1; UG/1
POWDER, METERED RESPIRATORY (INHALATION)
Qty: 1 EACH | Refills: 4 | Status: SHIPPED | OUTPATIENT
Start: 2024-04-15

## 2024-04-15 RX ORDER — HYDROCHLOROTHIAZIDE 25 MG/1
25 TABLET ORAL DAILY
Qty: 30 TABLET | Refills: 5 | Status: SHIPPED | OUTPATIENT
Start: 2024-04-15 | End: 2024-10-12

## 2024-04-15 RX ORDER — ALBUTEROL SULFATE 0.83 MG/ML
SOLUTION RESPIRATORY (INHALATION)
COMMUNITY
Start: 2022-05-06

## 2024-04-15 RX ORDER — BENZTROPINE MESYLATE 2 MG/1
1 TABLET ORAL NIGHTLY
COMMUNITY
Start: 2016-10-04

## 2024-04-15 RX ORDER — AMLODIPINE BESYLATE 5 MG/1
1 TABLET ORAL DAILY
COMMUNITY
Start: 2018-03-23 | End: 2024-04-15 | Stop reason: SDUPTHER

## 2024-04-15 RX ORDER — AMLODIPINE BESYLATE 5 MG/1
5 TABLET ORAL DAILY
Qty: 30 TABLET | Refills: 5 | Status: SHIPPED | OUTPATIENT
Start: 2024-04-15

## 2024-04-15 RX ORDER — OMEPRAZOLE 40 MG/1
40 CAPSULE, DELAYED RELEASE ORAL
Qty: 30 CAPSULE | Refills: 5 | Status: SHIPPED | OUTPATIENT
Start: 2024-04-15 | End: 2024-05-15

## 2024-04-15 RX ORDER — FLUTICASONE PROPIONATE AND SALMETEROL 113; 14 UG/1; UG/1
POWDER, METERED RESPIRATORY (INHALATION)
COMMUNITY
Start: 2023-01-04 | End: 2024-04-15 | Stop reason: SDUPTHER

## 2024-04-15 RX ORDER — ALBUTEROL SULFATE 90 UG/1
2 AEROSOL, METERED RESPIRATORY (INHALATION) EVERY 4 HOURS PRN
Qty: 18 G | Refills: 5 | Status: SHIPPED | OUTPATIENT
Start: 2024-04-15

## 2024-04-15 ASSESSMENT — ENCOUNTER SYMPTOMS
DIZZINESS: 0
WEAKNESS: 0
LIGHT-HEADEDNESS: 0
DIARRHEA: 1
DIFFICULTY URINATING: 0
SORE THROAT: 0
DECREASED CONCENTRATION: 0
CONSTIPATION: 1
COUGH: 0
HEADACHES: 0
WHEEZING: 0
FLANK PAIN: 0
APPETITE CHANGE: 0
UNEXPECTED WEIGHT CHANGE: 0
FREQUENCY: 0
ARTHRALGIAS: 0
CHILLS: 0
ABDOMINAL PAIN: 0
SHORTNESS OF BREATH: 0
PALPITATIONS: 0
ACTIVITY CHANGE: 0
SLEEP DISTURBANCE: 0
DYSURIA: 0
BLOOD IN STOOL: 0
NERVOUS/ANXIOUS: 0
CHEST TIGHTNESS: 0
NECK PAIN: 0
BACK PAIN: 0

## 2024-04-15 NOTE — PROGRESS NOTES
Subjective   Patient ID: Kandy Finn is a 63 y.o. female who presents for Annual Exam (GI concerns with constipation, diarrhea/Abdominal pain).    HPI patient is seen today for physical exam.  Her medical history is significant for hypertension, COPD, chronic tobacco use, RODRIGUEZ, acid reflux, Schizophrenia, anxiety and depression.  She is still smoking cigarettes.  Recently she was diagnosed with trichomonas infection and received antibiotics.  She complains of epigastric pain with constipation alternating with diarrhea.  She has a past diagnosis of IBS.    She would like to also get a order for colonoscopy and mammogram.    Review of Systems   Constitutional:  Negative for activity change, appetite change, chills and unexpected weight change.   HENT:  Negative for congestion, postnasal drip and sore throat.    Eyes:  Negative for visual disturbance.   Respiratory:  Negative for cough, chest tightness, shortness of breath and wheezing.    Cardiovascular:  Negative for chest pain, palpitations and leg swelling.   Gastrointestinal:  Positive for constipation and diarrhea. Negative for abdominal pain and blood in stool.   Endocrine: Negative for cold intolerance and heat intolerance.   Genitourinary:  Positive for vaginal discharge. Negative for difficulty urinating, dysuria, flank pain and frequency.        Trichomonas infection   Musculoskeletal:  Negative for arthralgias, back pain, gait problem and neck pain.   Skin:  Negative for rash.   Allergic/Immunologic: Negative for environmental allergies and food allergies.   Neurological:  Negative for dizziness, weakness, light-headedness and headaches.   Psychiatric/Behavioral:  Negative for decreased concentration and sleep disturbance. The patient is not nervous/anxious.        Objective   BP (!) 153/93   Pulse 73   Temp 36.8 °C (98.3 °F)   Wt 125 kg (274 lb 9.6 oz)   BMI 35.26 kg/m²     Physical Exam  Vitals reviewed.   Constitutional:       General: She is not in  acute distress.     Appearance: Normal appearance.   HENT:      Head: Normocephalic and atraumatic.      Mouth/Throat:      Mouth: Mucous membranes are moist.   Eyes:      Extraocular Movements: Extraocular movements intact.      Conjunctiva/sclera: Conjunctivae normal.      Pupils: Pupils are equal, round, and reactive to light.   Cardiovascular:      Rate and Rhythm: Normal rate and regular rhythm.      Pulses: Normal pulses.   Pulmonary:      Effort: Pulmonary effort is normal. No respiratory distress.      Breath sounds: Normal breath sounds.   Abdominal:      General: Bowel sounds are normal. There is no distension.      Tenderness: There is no abdominal tenderness.   Musculoskeletal:         General: No swelling or tenderness. Normal range of motion.      Cervical back: Normal range of motion.   Skin:     General: Skin is warm.   Neurological:      General: No focal deficit present.      Mental Status: She is alert.      Coordination: Coordination normal.      Gait: Gait normal.   Psychiatric:         Mood and Affect: Mood normal.         Behavior: Behavior normal.         Assessment/Plan   Diagnoses and all orders for this visit:  Colon cancer screening  Comments:  Colonoscopy ordered  Orders:  -     Colonoscopy Screening; Average Risk Patient; Future  Epigastric pain  Comments:  History of IBS with constipation and diarrhea  Refer to gastroenterology  Orders:  -     Referral to Gastroenterology; Future  Obstructive sleep apnea (adult) (pediatric)  -     fluticasone propion-salmeteroL (AirDuo RespiClick) 113-14 mcg/actuation inhaler; One inhalation daily  -     albuterol 90 mcg/actuation inhaler; Inhale 2 puffs every 4 hours if needed for shortness of breath or wheezing.  -     budesonide-glycopyr-formoterol (BREZTRI) 160-9-4.8 mcg/actuation HFA aerosol inhaler; Inhale 2 puffs 2 times a day.  Primary hypertension  Comments:  Continue amlodipine and add hydrochlorothiazide for blood pressure control  Eat  healthy, exercise regularly  Orders:  -     CBC; Future  -     Comprehensive Metabolic Panel; Future  -     Lipid Panel; Future  -     TSH with reflex to Free T4 if abnormal; Future  -     amLODIPine (Norvasc) 5 mg tablet; Take 1 tablet (5 mg) by mouth once daily.  -     hydroCHLOROthiazide (HYDRODiuril) 25 mg tablet; Take 1 tablet (25 mg) by mouth once daily.  Prediabetes  Comments:  Check hemoglobin A1c to rule out diabetes  Orders:  -     Hemoglobin A1C; Future  Encounter for screening mammogram for malignant neoplasm of breast  Comments:  Screening mammogram ordered-patient to make appointment  Orders:  -     BI mammo bilateral screening tomosynthesis; Future  Gastroesophageal reflux disease without esophagitis  Comments:  Continue with omeprazole  Orders:  -     omeprazole (PriLOSEC) 40 mg DR capsule; Take 1 capsule (40 mg) by mouth once daily in the morning. Take before meals. Do not crush or chew.  Annual physical exam  Comments:  Physical exam completed  Blood work ordered  Encouraged patient to eat healthy, quit smoking  Pulmonary fibrosis, unspecified (Multi)  Paranoid schizophrenia (Multi)  Comments:  Continue with fluphenazine  Follow-up with psychiatry    Follow-up in 3 months for blood pressure check

## 2024-04-15 NOTE — PATIENT INSTRUCTIONS
You are seen today for physical exam and multiple health concerns  Go to the lab for blood work  Make appointment for mammogram and colonoscopy  Continue amlodipine for blood pressure and add hydrochlorothiazide.  Stop smoking.  Eat healthy.  Lose weight.  Follow-up in 3 months

## 2024-04-16 LAB
ALBUMIN SERPL BCP-MCNC: 3.7 G/DL (ref 3.4–5)
ALP SERPL-CCNC: 58 U/L (ref 33–136)
ALT SERPL W P-5'-P-CCNC: 9 U/L (ref 7–45)
ANION GAP SERPL CALC-SCNC: 11 MMOL/L (ref 10–20)
AST SERPL W P-5'-P-CCNC: 13 U/L (ref 9–39)
BILIRUB SERPL-MCNC: 0.6 MG/DL (ref 0–1.2)
BUN SERPL-MCNC: 13 MG/DL (ref 6–23)
CALCIUM SERPL-MCNC: 9.1 MG/DL (ref 8.6–10.6)
CHLORIDE SERPL-SCNC: 99 MMOL/L (ref 98–107)
CHOLEST SERPL-MCNC: 214 MG/DL (ref 0–199)
CHOLESTEROL/HDL RATIO: 2.8
CO2 SERPL-SCNC: 33 MMOL/L (ref 21–32)
CREAT SERPL-MCNC: 0.78 MG/DL (ref 0.5–1.05)
EGFRCR SERPLBLD CKD-EPI 2021: 85 ML/MIN/1.73M*2
GLUCOSE SERPL-MCNC: 110 MG/DL (ref 74–99)
HDLC SERPL-MCNC: 77.1 MG/DL
LDLC SERPL CALC-MCNC: 116 MG/DL
NON HDL CHOLESTEROL: 137 MG/DL (ref 0–149)
POTASSIUM SERPL-SCNC: 3.3 MMOL/L (ref 3.5–5.3)
PROT SERPL-MCNC: 6.1 G/DL (ref 6.4–8.2)
SODIUM SERPL-SCNC: 140 MMOL/L (ref 136–145)
TRIGL SERPL-MCNC: 106 MG/DL (ref 0–149)
TSH SERPL-ACNC: 1.37 MIU/L (ref 0.44–3.98)
VLDL: 21 MG/DL (ref 0–40)

## 2024-04-24 PROBLEM — L73.9 FOLLICULITIS: Status: ACTIVE | Noted: 2024-04-24

## 2024-04-24 PROBLEM — U07.1 DISEASE DUE TO SEVERE ACUTE RESPIRATORY SYNDROME CORONAVIRUS 2 (SARS-COV-2): Status: ACTIVE | Noted: 2024-04-24

## 2024-04-24 PROBLEM — R22.1 ENLARGEMENT OF NECK: Status: ACTIVE | Noted: 2024-04-24

## 2024-04-24 PROBLEM — R07.81 RIB PAIN: Status: ACTIVE | Noted: 2024-04-24

## 2024-04-24 PROBLEM — M25.559 ARTHRALGIA OF HIP: Status: ACTIVE | Noted: 2024-04-24

## 2024-04-24 PROBLEM — M77.10 LATERAL EPICONDYLITIS: Status: ACTIVE | Noted: 2024-04-24

## 2024-04-24 PROBLEM — G43.109 MIGRAINE WITH AURA: Status: ACTIVE | Noted: 2024-04-24

## 2024-04-24 PROBLEM — M25.569 KNEE PAIN: Status: ACTIVE | Noted: 2024-04-15

## 2024-04-24 PROBLEM — J20.9 ACUTE BRONCHITIS: Status: ACTIVE | Noted: 2024-04-24

## 2024-04-24 PROBLEM — R49.0 HOARSENESS: Status: ACTIVE | Noted: 2024-04-24

## 2024-04-24 PROBLEM — R19.7 ACUTE DIARRHEA: Status: ACTIVE | Noted: 2024-04-24

## 2024-04-24 PROBLEM — R29.6 FALLING: Status: ACTIVE | Noted: 2024-04-24

## 2024-04-24 PROBLEM — R21 RASH: Status: ACTIVE | Noted: 2024-02-28

## 2024-04-24 PROBLEM — M17.10 ARTHRITIS OF KNEE: Status: ACTIVE | Noted: 2024-04-24

## 2024-04-24 PROBLEM — M26.629 TEMPOROMANDIBULAR JOINT PAIN-DYSFUNCTION SYNDROME: Status: ACTIVE | Noted: 2024-04-24

## 2024-04-24 PROBLEM — J34.89 NASAL MUCOSA DRY: Status: ACTIVE | Noted: 2024-04-24

## 2024-04-24 RX ORDER — SIMVASTATIN 20 MG/1
1 TABLET, FILM COATED ORAL NIGHTLY
COMMUNITY
Start: 2020-11-16

## 2024-04-24 RX ORDER — LOSARTAN POTASSIUM 100 MG/1
100 TABLET ORAL
COMMUNITY
Start: 2022-11-10

## 2024-04-24 RX ORDER — TRIAMCINOLONE ACETONIDE 0.25 MG/G
1 OINTMENT TOPICAL 2 TIMES DAILY PRN
COMMUNITY
Start: 2020-08-21

## 2024-04-24 RX ORDER — HYDROXYZINE PAMOATE 25 MG/1
25 CAPSULE ORAL DAILY PRN
COMMUNITY
Start: 2023-12-15

## 2024-04-24 RX ORDER — OXYBUTYNIN CHLORIDE 5 MG/1
5 TABLET ORAL 4 TIMES DAILY PRN
COMMUNITY
Start: 2023-04-11

## 2024-04-25 ENCOUNTER — OFFICE VISIT (OUTPATIENT)
Dept: CARDIOLOGY | Facility: HOSPITAL | Age: 64
End: 2024-04-25
Payer: COMMERCIAL

## 2024-04-25 VITALS
SYSTOLIC BLOOD PRESSURE: 126 MMHG | OXYGEN SATURATION: 94 % | DIASTOLIC BLOOD PRESSURE: 86 MMHG | HEIGHT: 67 IN | HEART RATE: 89 BPM | BODY MASS INDEX: 42.06 KG/M2 | WEIGHT: 268 LBS

## 2024-04-25 DIAGNOSIS — E66.01 CLASS 3 SEVERE OBESITY WITH BODY MASS INDEX (BMI) OF 40.0 TO 44.9 IN ADULT, UNSPECIFIED OBESITY TYPE, UNSPECIFIED WHETHER SERIOUS COMORBIDITY PRESENT (MULTI): ICD-10-CM

## 2024-04-25 DIAGNOSIS — R07.9 CHEST PAIN, UNSPECIFIED TYPE: Primary | ICD-10-CM

## 2024-04-25 DIAGNOSIS — F17.210 CIGARETTE NICOTINE DEPENDENCE WITHOUT COMPLICATION: ICD-10-CM

## 2024-04-25 PROCEDURE — 3074F SYST BP LT 130 MM HG: CPT | Performed by: NURSE PRACTITIONER

## 2024-04-25 PROCEDURE — 99214 OFFICE O/P EST MOD 30 MIN: CPT | Performed by: NURSE PRACTITIONER

## 2024-04-25 PROCEDURE — 93005 ELECTROCARDIOGRAM TRACING: CPT | Performed by: NURSE PRACTITIONER

## 2024-04-25 PROCEDURE — 3008F BODY MASS INDEX DOCD: CPT | Performed by: NURSE PRACTITIONER

## 2024-04-25 PROCEDURE — 3079F DIAST BP 80-89 MM HG: CPT | Performed by: NURSE PRACTITIONER

## 2024-04-25 ASSESSMENT — ENCOUNTER SYMPTOMS
DIARRHEA: 0
OCCASIONAL FEELINGS OF UNSTEADINESS: 0
WEIGHT GAIN: 0
SHORTNESS OF BREATH: 1
PND: 0
ANOREXIA: 0
VOMITING: 0
DIZZINESS: 0
ORTHOPNEA: 0
LIGHT-HEADEDNESS: 0
DEPRESSION: 0
DYSPNEA ON EXERTION: 1
WEIGHT LOSS: 0
NEAR-SYNCOPE: 0
SLEEP DISTURBANCES DUE TO BREATHING: 0
LOSS OF SENSATION IN FEET: 0
IRREGULAR HEARTBEAT: 0
DECREASED APPETITE: 0
PALPITATIONS: 0
SYNCOPE: 0
NAUSEA: 0

## 2024-04-25 ASSESSMENT — PAIN SCALES - GENERAL: PAINLEVEL: 0-NO PAIN

## 2024-04-25 NOTE — PROGRESS NOTES
"Nan Finn is a 63 y.o. female.    Chief Complaint:  64yo patient here for atypical chest pain.     HPI  64yo patient here for atypical chest pain.     PMHx: HTN, HLD, Asthma, COPD, Paranoid Schizophrenia  PSHx: Cholecystectomy, Bladder Surgery, Tubal Ligation  Social Hx: Current smoker. Denies EtoH/illicit drug use.   Family Hx: Mother-CAD s/p multiple Mis. . Borther & sister - / \"heart disease\" in their 60s.     Endorses intermittent midsternal chest pain x 10 years. Describes as heartburn, some relief with belching. Denies radiating pain. No associated symptoms. No aggravating/alleviating factors. Denies palpitations. SOB on exertion. Some relief with inhaler use prn. Endorses difficulty climbing stairs. Sleeping with HOB elevated on pillows due to poorly controlled heartburn per patient. Denies lightheadedness, dizziness, and syncope. Denies edema. Medication adherent. Eating/drinking well. Denies N/V. Endorses 2 weeks of diarrhea w/stool incontinence.     Review of Systems   Constitutional: Negative for decreased appetite, weight gain and weight loss.   Cardiovascular:  Positive for chest pain and dyspnea on exertion. Negative for irregular heartbeat, leg swelling, near-syncope, orthopnea, palpitations, paroxysmal nocturnal dyspnea and syncope.   Respiratory:  Positive for shortness of breath. Negative for sleep disturbances due to breathing.    Gastrointestinal:  Negative for anorexia, diarrhea, nausea and vomiting.   Neurological:  Negative for dizziness and light-headedness.     Objective   Visit Vitals  /86 (BP Location: Left arm, Patient Position: Sitting)   Pulse 89   Ht 1.702 m (5' 7\")   Wt 122 kg (268 lb)   SpO2 94%   BMI 41.97 kg/m²   Smoking Status Every Day   BSA 2.4 m²     Vitals reviewed.   Constitutional:       Appearance: Healthy appearance.   Neck:      Vascular: JVD normal.   Pulmonary:      Effort: Pulmonary effort is normal.      Breath sounds: Normal " breath sounds.   Cardiovascular:      Normal rate. Regular rhythm. Normal S1. Normal S2.       Murmurs: There is no murmur.      No gallop.  No click. No rub.   Edema:     Peripheral edema absent.   Abdominal:      General: There is no distension.   Skin:     General: Skin is warm and dry.   Neurological:      Mental Status: Alert and oriented to person, place and time.     Lab Review:   Lab Results   Component Value Date     04/15/2024    K 3.3 (L) 04/15/2024    CL 99 04/15/2024    CO2 33 (H) 04/15/2024    BUN 13 04/15/2024    CREATININE 0.78 04/15/2024    GLUCOSE 110 (H) 04/15/2024    CALCIUM 9.1 04/15/2024     Lab Results   Component Value Date    WBC 4.5 04/15/2024    HGB 14.1 04/15/2024    HCT 45.4 04/15/2024    MCV 94 04/15/2024     04/15/2024       Current Outpatient Medications:     albuterol 2.5 mg /3 mL (0.083 %) nebulizer solution, INHALE 1 VIAL VIA NEBULIZER EVERY 4 TO 6 HOURS AS NEEDED FOR WHEEZINGOR SHORTNESS OF BREATH, Disp: , Rfl:     albuterol 90 mcg/actuation inhaler, Inhale 2 puffs every 4 hours if needed for shortness of breath or wheezing., Disp: 18 g, Rfl: 5    amLODIPine (Norvasc) 5 mg tablet, Take 1 tablet (5 mg) by mouth once daily., Disp: 30 tablet, Rfl: 5    benztropine (Cogentin) 2 mg tablet, Take 1 tablet (2 mg) by mouth once daily at bedtime., Disp: , Rfl:     budesonide-glycopyr-formoterol (BREZTRI) 160-9-4.8 mcg/actuation HFA aerosol inhaler, Inhale 2 puffs 2 times a day., Disp: 5.9 g, Rfl: 3    fluPHENAZine decanoate (Prolixin) 25 mg/mL injection, Inject 2 mL (50 mg) into the muscle every 14 (fourteen) days., Disp: , Rfl:     fluticasone propion-salmeteroL (AirDuo RespiClick) 113-14 mcg/actuation inhaler, One inhalation daily, Disp: 1 each, Rfl: 4    hydroCHLOROthiazide (HYDRODiuril) 25 mg tablet, Take 1 tablet (25 mg) by mouth once daily., Disp: 30 tablet, Rfl: 5    hydrOXYzine pamoate (Vistaril) 25 mg capsule, Take 1 capsule (25 mg) by mouth once daily as needed for  anxiety., Disp: , Rfl:     losartan (Cozaar) 100 mg tablet, Take 1 tablet (100 mg) by mouth once daily., Disp: , Rfl:     omeprazole (PriLOSEC) 40 mg DR capsule, Take 1 capsule (40 mg) by mouth once daily in the morning. Take before meals. Do not crush or chew., Disp: 30 capsule, Rfl: 5    oxybutynin (Ditropan) 5 mg tablet, Take 1 tablet (5 mg) by mouth 4 times a day as needed (incontinence)., Disp: , Rfl:     simvastatin (Zocor) 20 mg tablet, Take 1 tablet (20 mg) by mouth once daily at bedtime., Disp: , Rfl:     sodium chloride (Ayr) 0.65 % nasal drops, Administer 1 drop into affected nostril(s) 2 times a day as needed for congestion., Disp: , Rfl:     triamcinolone (Kenalog) 0.025 % ointment, Apply 1 Application topically 2 times a day as needed for irritation or rash., Disp: , Rfl:     Assessment/Plan   Atypical Chest Pain  Endorses intermittent midsternal chest pain x 10 years. Describes as heartburn, some relief with belching. Denies radiating pain. No associated symptoms. No aggravating/alleviating factors. Appears euvolemic and normotensive on exam. EKG in office today 4/25/24 NSR w/ HR 84bpm w/moderate voltage criteria for LVH. No significant change from prior EKG 2/2024. Previous cardiovascular testing includes: TTE 2018 LVEF 63% w/impaired relaxation pattern of LV diastolic filling. Mild MR. BNP 2021 WNL. Estimated ASCVD current 10-year risk 15.2% (Intermediate). Referral for CT cardiac scoring.     Nicotine Dependence   Current smoker. Referral to Tobacco Cessation Counseling.     Obesity  BMI 41. Referral to [facility].     HTN  Well controlled with current medications. Continue Hydrochlorothiazide 25mg every day, Losartan 100mg every day, and Amlodipine 5mg every day. Follows with PCP.

## 2024-04-25 NOTE — PATIENT INSTRUCTIONS
Call [phone] to schedule CT Cardiac Score. We will call you with results.   You have been referred to Tobacco Cessation Counseling to help you quit smoking and [facility] program to help with your weight loss goals.

## 2024-04-26 LAB
ATRIAL RATE: 84 BPM
P AXIS: 57 DEGREES
P OFFSET: 186 MS
P ONSET: 122 MS
PR INTERVAL: 174 MS
Q ONSET: 209 MS
QRS COUNT: 14 BEATS
QRS DURATION: 96 MS
QT INTERVAL: 414 MS
QTC CALCULATION(BAZETT): 489 MS
QTC FREDERICIA: 462 MS
R AXIS: -12 DEGREES
T AXIS: 47 DEGREES
T OFFSET: 416 MS
VENTRICULAR RATE: 84 BPM

## 2024-05-06 ENCOUNTER — LAB (OUTPATIENT)
Dept: LAB | Facility: LAB | Age: 64
End: 2024-05-06
Payer: COMMERCIAL

## 2024-05-06 ENCOUNTER — CLINICAL SUPPORT (OUTPATIENT)
Dept: CARDIAC REHAB | Facility: HOSPITAL | Age: 64
End: 2024-05-06
Payer: COMMERCIAL

## 2024-05-06 ENCOUNTER — OFFICE VISIT (OUTPATIENT)
Dept: GASTROENTEROLOGY | Facility: HOSPITAL | Age: 64
End: 2024-05-06
Payer: COMMERCIAL

## 2024-05-06 VITALS
TEMPERATURE: 96.7 F | SYSTOLIC BLOOD PRESSURE: 137 MMHG | OXYGEN SATURATION: 95 % | RESPIRATION RATE: 20 BRPM | HEIGHT: 67 IN | HEART RATE: 71 BPM | WEIGHT: 280 LBS | DIASTOLIC BLOOD PRESSURE: 89 MMHG | BODY MASS INDEX: 43.95 KG/M2

## 2024-05-06 DIAGNOSIS — F17.210 CIGARETTE NICOTINE DEPENDENCE WITHOUT COMPLICATION: ICD-10-CM

## 2024-05-06 DIAGNOSIS — K58.2 IRRITABLE BOWEL SYNDROME WITH BOTH CONSTIPATION AND DIARRHEA: Primary | ICD-10-CM

## 2024-05-06 DIAGNOSIS — K21.00 GASTROESOPHAGEAL REFLUX DISEASE WITH ESOPHAGITIS WITHOUT HEMORRHAGE: ICD-10-CM

## 2024-05-06 DIAGNOSIS — F17.210 CIGARETTE SMOKER: ICD-10-CM

## 2024-05-06 DIAGNOSIS — R14.0 ABDOMINAL BLOATING: ICD-10-CM

## 2024-05-06 PROCEDURE — 83013 H PYLORI (C-13) BREATH: CPT

## 2024-05-06 PROCEDURE — 99214 OFFICE O/P EST MOD 30 MIN: CPT | Performed by: INTERNAL MEDICINE

## 2024-05-06 PROCEDURE — 99407 BEHAV CHNG SMOKING > 10 MIN: CPT | Performed by: INTERNAL MEDICINE

## 2024-05-06 PROCEDURE — 3075F SYST BP GE 130 - 139MM HG: CPT | Performed by: INTERNAL MEDICINE

## 2024-05-06 PROCEDURE — 3008F BODY MASS INDEX DOCD: CPT | Performed by: INTERNAL MEDICINE

## 2024-05-06 PROCEDURE — 3079F DIAST BP 80-89 MM HG: CPT | Performed by: INTERNAL MEDICINE

## 2024-05-06 ASSESSMENT — ENCOUNTER SYMPTOMS
SHORTNESS OF BREATH: 1
UNEXPECTED WEIGHT CHANGE: 1
DYSURIA: 0
HEADACHES: 0
BACK PAIN: 0
MYALGIAS: 0
FEVER: 0
FATIGUE: 0
COUGH: 0
DIAPHORESIS: 0
WEAKNESS: 0
DYSPHORIC MOOD: 0
CHILLS: 0
TROUBLE SWALLOWING: 0
HALLUCINATIONS: 1
LIGHT-HEADEDNESS: 0
ARTHRALGIAS: 0

## 2024-05-06 ASSESSMENT — PAIN SCALES - GENERAL: PAINLEVEL: 0-NO PAIN

## 2024-05-06 NOTE — PATIENT INSTRUCTIONS
Continue taking omeprazole 40 mg once daily.  Check H. Pylori breath test.  Take soluble fiber supplement (Metamucil or Benefiber: 2 teaspoons mix in a glass of water daily).  Schedule for diagnostic EGD and colonoscopy with anesthesia.  Follow-up to be determined pending above tests.

## 2024-05-06 NOTE — PROGRESS NOTES
Subjective     History of Present Illness:   Kandy Finn is a 63 y.o. female with GERD, HTN, RODRIGUEZ, COPD, tobacco use, and schizophrenia who presented to GI clinic for consultation.  Patient reported having alternating diarrhea and constipation for many years.  Patient reports abdominal bloating and nausea without vomiting.  She denies having blood in her stool or weight loss.  She underwent EGD and colonoscopy on 8/5/15 which showed LA grade A esophagitis, 3 cm hiatal hernia, gastropathy, sigmoid diverticulosis, and colon polyps (hyperplastic).  Patient has taken sapna-selzer and pepto-bismol which did not help.  She takes omeprazole 40 mg once a day which helps her acid reflux.      Review of Systems  Review of Systems   Constitutional:  Positive for unexpected weight change. Negative for chills, diaphoresis, fatigue and fever.   HENT:  Negative for trouble swallowing.    Respiratory:  Positive for shortness of breath. Negative for cough.    Cardiovascular:  Negative for chest pain.   Genitourinary:  Negative for dysuria.   Musculoskeletal:  Negative for arthralgias, back pain and myalgias.   Skin:  Negative for rash.   Allergic/Immunologic: Negative for immunocompromised state.   Neurological:  Negative for weakness, light-headedness and headaches.   Psychiatric/Behavioral:  Positive for hallucinations. Negative for dysphoric mood.        Past Medical History   has a past medical history of Contact with and (suspected) exposure to infections with a predominantly sexual mode of transmission (04/03/2017), Encounter for screening for infections with a predominantly sexual mode of transmission, Lumbar radiculopathy, chronic (04/15/2024), Personal history of diseases of the skin and subcutaneous tissue (03/15/2016), Personal history of other diseases of the digestive system (01/16/2018), Personal history of other diseases of the nervous system and sense organs (07/07/2020), Personal history of other diseases of the  respiratory system (02/16/2016), Personal history of other diseases of the respiratory system (03/06/2017), Personal history of other specified conditions (08/30/2018), Personal history of other specified conditions (03/23/2020), and Unspecified abdominal pain (10/12/2017).     Social History   reports that she has been smoking cigarettes. She has never used smokeless tobacco.     Family History  family history is not on file.     Allergies  Allergies   Allergen Reactions    Penicillins Anaphylaxis, Swelling, Unknown, Hives and Itching     rash.itching    Aspirin Hives, Itching and Swelling    Lisinopril Angioedema and Swelling       Medications  Current Outpatient Medications   Medication Instructions    albuterol 2.5 mg /3 mL (0.083 %) nebulizer solution INHALE 1 VIAL VIA NEBULIZER EVERY 4 TO 6 HOURS AS NEEDED FOR WHEEZINGOR SHORTNESS OF BREATH    albuterol 90 mcg/actuation inhaler 2 puffs, inhalation, Every 4 hours PRN    amLODIPine (NORVASC) 5 mg, oral, Daily    benztropine (Cogentin) 2 mg tablet 1 tablet, oral, Nightly    budesonide-glycopyr-formoterol (BREZTRI) 160-9-4.8 mcg/actuation HFA aerosol inhaler 2 puffs, inhalation, 2 times daily RT    fluPHENAZine decanoate (PROLIXIN) 50 mg, intramuscular, Every 21 days    fluticasone propion-salmeteroL (AirDuo RespiClick) 113-14 mcg/actuation inhaler One inhalation daily    hydroCHLOROthiazide (HYDRODIURIL) 25 mg, oral, Daily    hydrOXYzine pamoate (VISTARIL) 25 mg, oral, Daily PRN    losartan (COZAAR) 100 mg, oral, Daily RT    omeprazole (PRILOSEC) 40 mg, oral, Daily before breakfast, Do not crush or chew.    oxybutynin (DITROPAN) 5 mg, oral, 4 times daily PRN    simvastatin (Zocor) 20 mg tablet 1 tablet, oral, Nightly    sodium chloride (Ayr) 0.65 % nasal drops 1 drop, nasal, 2 times daily PRN    triamcinolone (Kenalog) 0.025 % ointment 1 Application, Topical, 2 times daily PRN        Objective   Visit Vitals  /89 (BP Location: Left arm, Patient Position:  "Sitting, BP Cuff Size: Adult)   Pulse 71   Temp 35.9 °C (96.7 °F)   Resp 20      Body mass index is 43.85 kg/m².  Physical Exam  Constitutional:       General: She is not in acute distress.     Appearance: Normal appearance.   HENT:      Head: Normocephalic and atraumatic.   Eyes:      Extraocular Movements: Extraocular movements intact.   Cardiovascular:      Rate and Rhythm: Normal rate and regular rhythm.      Heart sounds: Normal heart sounds. No murmur heard.  Pulmonary:      Breath sounds: Normal breath sounds.      Comments: Decrease air movement in bilateral lung fields, no crackles or wheezing  Abdominal:      General: Bowel sounds are normal. There is no distension.      Palpations: Abdomen is soft.      Tenderness: There is abdominal tenderness. There is no guarding or rebound.      Comments: Periumbilical tenderness to palpation   Musculoskeletal:         General: No swelling or tenderness. Normal range of motion.      Cervical back: Normal range of motion and neck supple.   Skin:     General: Skin is warm.      Coloration: Skin is not jaundiced.   Neurological:      General: No focal deficit present.      Mental Status: She is alert and oriented to person, place, and time.   Psychiatric:         Mood and Affect: Mood normal.         Behavior: Behavior normal.         Labs  Lab Results   Component Value Date    WBC 4.5 04/15/2024    HGB 14.1 04/15/2024    HCT 45.4 04/15/2024    MCV 94 04/15/2024     04/15/2024     Lab Results   Component Value Date    GLUCOSE 110 (H) 04/15/2024    CALCIUM 9.1 04/15/2024     04/15/2024    K 3.3 (L) 04/15/2024    CO2 33 (H) 04/15/2024    CL 99 04/15/2024    BUN 13 04/15/2024    CREATININE 0.78 04/15/2024     Lab Results   Component Value Date    ALT 9 04/15/2024    AST 13 04/15/2024    ALKPHOS 58 04/15/2024    BILITOT 0.6 04/15/2024     Lab Results   Component Value Date    INR 1.1 11/28/2017     No results found for: \"CALPS\"    Assessment/Plan   Kandy Rias " is a 63 y.o. female with GERD, HTN, RODRIGUEZ, COPD, tobacco use, and schizophrenia who presented to GI clinic for consultation.  Patient reported having alternating diarrhea and constipation for many years with associated bloating and nausea. She may have IBS or dyspepsia.  Check H. Pylori breath test.  Continue taking omeprazole once a day.  Trial of fiber supplement daily.  Schedule for diagnostic EGD and colonoscopy.  Further recommendations pending these evaluations.      Patient Instructions  Continue taking omeprazole 40 mg once daily.  Check H. Pylori breath test.  Take soluble fiber supplement (metamucil or benefiber: 2 teaspoons mix in a glass of water daily).  Schedule for diagnostic EGD and colonoscopy with anesthesia.  Follow-up to be determined pending above tests.    Juan Rm MD

## 2024-05-07 LAB — UREA BREATH TEST QL: NEGATIVE

## 2024-05-08 NOTE — PROGRESS NOTES
Tobacco Treatment Counseling- Initial Visit     Name: Kandy Finn            MRN: [id number]          YOB: 1960           Age: 63 y.o.                  Today’s Date: 5/8/2024  Primary Care Physician: Yolis Franz MD  Referring Physician: Anna Cunningham APRN-*  Program Location: [facility] [place] [id number]         Start time: 11:20 AM   End time: 11:54 AM      Kandy Finn presents for initial session for Tobacco Treatment Counseling. Patient currently smoking 8-9 CPD and has been smoking since the age of 4 (smoked the cigarette butts from her sister). Patient has a high dependence on nicotine according to the [employer] nicotine dependence assessment. At this time, patient is motivated to quit smoking due to health concerns including SOB w/ both rest and exertion. See below for detailed assessment of tobacco use and treatment plan.    Smoking triggers/routines:  talking on the phone, driving, seeing a cigarette, drinking coffee, after eating, stress/anxiety, trauma    Past quit attempts:  quit for 2 weeks but relapsed when her sister came over and smoked in her home    Potential barriers to quitting:  patient has a lot of trauma from both childhood and adulthood which causes her to experience a lot of depression and anxiety    Strengths:  high motivation    Medication plan:  varenicline    Coping strategies:  distraction techniques, stress/anxiety management, subs/replacements    Next steps:  start varenicline, quit at the start of second week of taking the medication    Follow-up scheduled 5/13/24 to evaluate progress and make any necessary changes to quit plan. Patient to call office at [phone] with any questions/concerns.      Tobacco Treatment Specialist- Geno Sarabia RN

## 2024-05-13 ENCOUNTER — TELEMEDICINE CLINICAL SUPPORT (OUTPATIENT)
Dept: CARDIAC REHAB | Facility: HOSPITAL | Age: 64
End: 2024-05-13
Payer: COMMERCIAL

## 2024-05-13 DIAGNOSIS — E87.6 HYPOKALEMIA: Primary | ICD-10-CM

## 2024-05-13 DIAGNOSIS — F17.210 CIGARETTE SMOKER: ICD-10-CM

## 2024-05-13 PROCEDURE — 99406 BEHAV CHNG SMOKING 3-10 MIN: CPT | Mod: GT,95 | Performed by: INTERNAL MEDICINE

## 2024-05-13 RX ORDER — VARENICLINE TARTRATE 0.5 (11)-1
KIT ORAL
Qty: 56 EACH | Refills: 0 | Status: SHIPPED | OUTPATIENT
Start: 2024-05-13 | End: 2024-08-08

## 2024-05-13 RX ORDER — VARENICLINE TARTRATE 1 MG/1
TABLET, FILM COATED ORAL
Qty: 56 TABLET | Refills: 1 | Status: SHIPPED | OUTPATIENT
Start: 2024-05-13

## 2024-05-13 RX ORDER — POTASSIUM CHLORIDE 750 MG/1
10 TABLET, FILM COATED, EXTENDED RELEASE ORAL DAILY
Qty: 30 TABLET | Refills: 1 | Status: SHIPPED | OUTPATIENT
Start: 2024-05-13 | End: 2025-05-13

## 2024-05-13 NOTE — PROGRESS NOTES
Tobacco Cessation Counseling Session Note    Name: Kandy Finn            MRN: [id number]          YOB: 1960           Age: 63 y.o.                  Today’s Date: 6/5/2024  Primary Care Physician: Yolis Franz MD  Referring Physician: No ref. provider found  Program Location: [facility] [place] [id number]         Start time: 2:01 PM   End time: 2:10 PM      Patient presents for follow up session for tobacco treatment counseling. Needs to discuss varenicline with psychiatrist. Discussed alternative options. Wants to try nicotine nasal spray. Will place Rx.     Geno Sarabia RN

## 2024-05-24 ENCOUNTER — TELEMEDICINE CLINICAL SUPPORT (OUTPATIENT)
Dept: CARDIAC REHAB | Facility: CLINIC | Age: 64
End: 2024-05-24
Payer: COMMERCIAL

## 2024-05-24 DIAGNOSIS — F17.210 CIGARETTE SMOKER: ICD-10-CM

## 2024-05-24 PROCEDURE — 99406 BEHAV CHNG SMOKING 3-10 MIN: CPT | Performed by: INTERNAL MEDICINE

## 2024-05-24 NOTE — PROGRESS NOTES
Start: 1:31 PM  1:39 PM  UPDATES: Quit date set for next Thursday. Second week of taking varenicline. Also plans to attend  tobacco support group.

## 2024-06-12 ENCOUNTER — TELEMEDICINE CLINICAL SUPPORT (OUTPATIENT)
Dept: CARDIAC REHAB | Facility: CLINIC | Age: 64
End: 2024-06-12
Payer: COMMERCIAL

## 2024-06-12 DIAGNOSIS — F17.210 CIGARETTE SMOKER: ICD-10-CM

## 2024-06-12 PROCEDURE — 99406 BEHAV CHNG SMOKING 3-10 MIN: CPT | Mod: GT,95 | Performed by: INTERNAL MEDICINE

## 2024-06-12 NOTE — PROGRESS NOTES
"Tobacco Treatment Counseling Follow Up Visit    Name: Kandy Finn            MRN: [id number]          YOB: 1960           Age: 63 y.o.                  Today’s Date: 6/12/2024  Primary Care Physician: Yolis Franz MD  Program Location: [address]         Start time: 2:01 PM   End time: 2:08 PM      Kandy Finn presents for follow up session for tobacco treatment counseling. UPDATES: Patient reports stopping the varenicline a couple of days ago. Says that she was \"feeling weird\" while taking it and that it was causing nightmares. Will start nicotine nasal spray, Rx placed. Smoking 6-7 CPD. May need to add in nicotine patch in combo. Will follow up in two weeks to assess progress and discuss next steps.    Tobacco Treatment Specialist- Geno Sarabia RN   "

## 2024-06-25 ENCOUNTER — HOSPITAL ENCOUNTER (OUTPATIENT)
Dept: RADIOLOGY | Facility: HOSPITAL | Age: 64
End: 2024-06-25
Payer: COMMERCIAL

## 2024-07-16 ENCOUNTER — HOSPITAL ENCOUNTER (OUTPATIENT)
Dept: RADIOLOGY | Facility: HOSPITAL | Age: 64
Discharge: HOME | End: 2024-07-16
Payer: COMMERCIAL

## 2024-07-16 DIAGNOSIS — F17.210 CIGARETTE NICOTINE DEPENDENCE WITHOUT COMPLICATION: ICD-10-CM

## 2024-07-16 DIAGNOSIS — R07.9 CHEST PAIN, UNSPECIFIED TYPE: ICD-10-CM

## 2024-07-16 PROCEDURE — 75571 CT HRT W/O DYE W/CA TEST: CPT

## 2024-07-17 ENCOUNTER — TELEPHONE (OUTPATIENT)
Dept: CARDIOLOGY | Facility: CLINIC | Age: 64
End: 2024-07-17
Payer: COMMERCIAL

## 2024-07-17 NOTE — TELEPHONE ENCOUNTER
Reviewed results of CT cardiac scoring with patient via telephone. No further follow up is needed with cardiology at this time. Patient is following with GI regarding ongoing heartburn, constipation/diarrhea.

## 2024-07-18 ENCOUNTER — APPOINTMENT (OUTPATIENT)
Dept: PRIMARY CARE | Facility: CLINIC | Age: 64
End: 2024-07-18
Payer: COMMERCIAL

## 2024-07-18 VITALS
WEIGHT: 275.4 LBS | SYSTOLIC BLOOD PRESSURE: 129 MMHG | BODY MASS INDEX: 43.13 KG/M2 | DIASTOLIC BLOOD PRESSURE: 84 MMHG | TEMPERATURE: 98.2 F

## 2024-07-18 DIAGNOSIS — I10 PRIMARY HYPERTENSION: ICD-10-CM

## 2024-07-18 DIAGNOSIS — E78.49 OTHER HYPERLIPIDEMIA: ICD-10-CM

## 2024-07-18 DIAGNOSIS — K21.9 GASTROESOPHAGEAL REFLUX DISEASE WITHOUT ESOPHAGITIS: Primary | ICD-10-CM

## 2024-07-18 DIAGNOSIS — J42 CHRONIC BRONCHITIS, UNSPECIFIED CHRONIC BRONCHITIS TYPE (MULTI): ICD-10-CM

## 2024-07-18 DIAGNOSIS — M25.561 CHRONIC PAIN OF BOTH KNEES: ICD-10-CM

## 2024-07-18 DIAGNOSIS — M25.562 CHRONIC PAIN OF BOTH KNEES: ICD-10-CM

## 2024-07-18 DIAGNOSIS — G89.29 CHRONIC PAIN OF BOTH KNEES: ICD-10-CM

## 2024-07-18 DIAGNOSIS — J84.10 PULMONARY FIBROSIS, UNSPECIFIED (MULTI): ICD-10-CM

## 2024-07-18 PROCEDURE — 3074F SYST BP LT 130 MM HG: CPT | Performed by: INTERNAL MEDICINE

## 2024-07-18 PROCEDURE — 99214 OFFICE O/P EST MOD 30 MIN: CPT | Performed by: INTERNAL MEDICINE

## 2024-07-18 PROCEDURE — 3079F DIAST BP 80-89 MM HG: CPT | Performed by: INTERNAL MEDICINE

## 2024-07-18 RX ORDER — SIMVASTATIN 20 MG/1
20 TABLET, FILM COATED ORAL NIGHTLY
Qty: 90 TABLET | Refills: 1 | Status: SHIPPED | OUTPATIENT
Start: 2024-07-18

## 2024-07-18 RX ORDER — LOSARTAN POTASSIUM 100 MG/1
100 TABLET ORAL
Qty: 90 TABLET | Refills: 1 | Status: SHIPPED | OUTPATIENT
Start: 2024-07-18

## 2024-07-18 RX ORDER — AMLODIPINE BESYLATE 5 MG/1
5 TABLET ORAL DAILY
Qty: 90 TABLET | Refills: 1 | Status: SHIPPED | OUTPATIENT
Start: 2024-07-18

## 2024-07-18 RX ORDER — HYDROCHLOROTHIAZIDE 25 MG/1
25 TABLET ORAL DAILY
Qty: 90 TABLET | Refills: 1 | Status: SHIPPED | OUTPATIENT
Start: 2024-07-18 | End: 2025-01-14

## 2024-07-18 ASSESSMENT — ENCOUNTER SYMPTOMS
NERVOUS/ANXIOUS: 0
DECREASED CONCENTRATION: 0
UNEXPECTED WEIGHT CHANGE: 0
SHORTNESS OF BREATH: 0
WHEEZING: 0
BACK PAIN: 1
ACTIVITY CHANGE: 0
CHEST TIGHTNESS: 0
ARTHRALGIAS: 1
ABDOMINAL PAIN: 0
SLEEP DISTURBANCE: 0
LIGHT-HEADEDNESS: 0
SORE THROAT: 0
DIZZINESS: 0
WEAKNESS: 0
DIFFICULTY URINATING: 0
PALPITATIONS: 0
COUGH: 0
FLANK PAIN: 0
CHILLS: 0
DYSURIA: 0
BLOOD IN STOOL: 0
FREQUENCY: 0
NECK PAIN: 0
HEADACHES: 0
APPETITE CHANGE: 0

## 2024-07-18 ASSESSMENT — PAIN SCALES - GENERAL: PAINLEVEL: 6

## 2024-07-18 NOTE — PATIENT INSTRUCTIONS
Include plenty of fruits and vegetables as well as lean protein  Cut back on processed foods and added sugars  Maintain adequate water intake  Incorporate daily exercise or any form of physical activity for at least 30 minutes     Follow in 4-5 months

## 2024-07-18 NOTE — PROGRESS NOTES
Subjective   Patient ID: Kandy Finn is a 63 y.o. female who presents for Follow-up.    HPI patient is seen today for routine follow-up. Her medical history is significant for hypertension, COPD, prediabetes, RODRIGUEZ, acid reflux, tobacco use-trying to quit, and enrolled in tobacco cessation program. She has anxiety, depression and schizophrenia for which she takes medications and see psychiatrist.  She complains of pain in her leg, knees and lower back.    Review of Systems   Constitutional:  Negative for activity change, appetite change, chills and unexpected weight change.   HENT:  Negative for congestion, postnasal drip and sore throat.    Eyes:  Negative for visual disturbance.   Respiratory:  Negative for cough, chest tightness, shortness of breath and wheezing.    Cardiovascular:  Negative for chest pain, palpitations and leg swelling.   Gastrointestinal:  Negative for abdominal pain and blood in stool.   Genitourinary:  Negative for difficulty urinating, dysuria, flank pain and frequency.   Musculoskeletal:  Positive for arthralgias and back pain. Negative for gait problem and neck pain.        Knee pain  Leg pain   Skin:  Negative for rash.   Neurological:  Negative for dizziness, weakness, light-headedness and headaches.   Psychiatric/Behavioral:  Negative for decreased concentration and sleep disturbance. The patient is not nervous/anxious.        Objective   /84 (BP Location: Right arm, Patient Position: Sitting)   Temp 36.8 °C (98.2 °F) (Oral)   Wt 125 kg (275 lb 6.4 oz)   BMI 43.13 kg/m²     Physical Exam  Vitals reviewed.   Constitutional:       General: She is not in acute distress.     Appearance: Normal appearance. She is obese.   HENT:      Head: Normocephalic and atraumatic.      Mouth/Throat:      Mouth: Mucous membranes are moist.   Cardiovascular:      Rate and Rhythm: Normal rate and regular rhythm.      Pulses: Normal pulses.   Pulmonary:      Effort: Pulmonary effort is normal. No  respiratory distress.      Breath sounds: Rhonchi present.   Abdominal:      General: Bowel sounds are normal. There is no distension.      Tenderness: There is no abdominal tenderness.   Musculoskeletal:         General: No swelling or tenderness. Normal range of motion.      Cervical back: Normal range of motion.   Skin:     General: Skin is warm.   Neurological:      General: No focal deficit present.      Mental Status: She is alert.      Coordination: Coordination normal.      Gait: Gait abnormal.   Psychiatric:         Mood and Affect: Mood normal.         Behavior: Behavior normal.         Assessment/Plan   Diagnoses and all orders for this visit:  Gastroesophageal reflux disease without esophagitis  Comments:  Continue omeprazole  Pulmonary fibrosis, unspecified (Multi)  Comments:  Follow-up with pulmonology  BMI 40.0-44.9, adult (Multi)  Comments:  Patient to eat healthy, eat healthy  and exercise as tolerated  Chronic pain of both knees  Comments:  Refer to orthopedics  Orders:  -     Referral to Orthopaedic Surgery; Future  Chronic bronchitis, unspecified chronic bronchitis type (Multi)  Comments:  Due to tobacco use  continue current inhalers  Primary hypertension  Comments:  Continue amlodipine,losartan and hydrochlorothiazide  Eat healthy, exercise regularly  Orders:  -     amLODIPine (Norvasc) 5 mg tablet; Take 1 tablet (5 mg) by mouth once daily.  -     losartan (Cozaar) 100 mg tablet; Take 1 tablet (100 mg) by mouth once daily.  -     hydroCHLOROthiazide (HYDRODiuril) 25 mg tablet; Take 1 tablet (25 mg) by mouth once daily.  -     Comprehensive Metabolic Panel; Future  -     Hemoglobin A1C; Future  Other hyperlipidemia  Comments:  Continues simvastatin  Orders:  -     simvastatin (Zocor) 20 mg tablet; Take 1 tablet (20 mg) by mouth once daily at bedtime.    Prediabetes-bloodwork ordered prior to next appointment  encouraged patient to lose weight and quit smoking

## 2024-08-01 ENCOUNTER — OFFICE VISIT (OUTPATIENT)
Dept: ORTHOPEDIC SURGERY | Facility: CLINIC | Age: 64
End: 2024-08-01
Payer: COMMERCIAL

## 2024-08-01 ENCOUNTER — HOSPITAL ENCOUNTER (OUTPATIENT)
Dept: RADIOLOGY | Facility: CLINIC | Age: 64
Discharge: HOME | End: 2024-08-01
Payer: COMMERCIAL

## 2024-08-01 DIAGNOSIS — M25.561 PAIN IN BOTH KNEES, UNSPECIFIED CHRONICITY: ICD-10-CM

## 2024-08-01 DIAGNOSIS — M25.562 PAIN IN BOTH KNEES, UNSPECIFIED CHRONICITY: ICD-10-CM

## 2024-08-01 DIAGNOSIS — G89.29 CHRONIC PAIN OF BOTH KNEES: ICD-10-CM

## 2024-08-01 DIAGNOSIS — M25.562 CHRONIC PAIN OF BOTH KNEES: ICD-10-CM

## 2024-08-01 DIAGNOSIS — M25.561 CHRONIC PAIN OF BOTH KNEES: ICD-10-CM

## 2024-08-01 DIAGNOSIS — M17.11 PRIMARY OSTEOARTHRITIS OF RIGHT KNEE: Primary | ICD-10-CM

## 2024-08-01 PROCEDURE — 73560 X-RAY EXAM OF KNEE 1 OR 2: CPT | Mod: LT

## 2024-08-01 PROCEDURE — 2500000004 HC RX 250 GENERAL PHARMACY W/ HCPCS (ALT 636 FOR OP/ED): Performed by: ORTHOPAEDIC SURGERY

## 2024-08-01 PROCEDURE — 73562 X-RAY EXAM OF KNEE 3: CPT | Mod: RT

## 2024-08-01 PROCEDURE — 20610 DRAIN/INJ JOINT/BURSA W/O US: CPT | Mod: RT | Performed by: ORTHOPAEDIC SURGERY

## 2024-08-01 PROCEDURE — 2500000005 HC RX 250 GENERAL PHARMACY W/O HCPCS: Performed by: ORTHOPAEDIC SURGERY

## 2024-08-01 PROCEDURE — 99213 OFFICE O/P EST LOW 20 MIN: CPT | Performed by: ORTHOPAEDIC SURGERY

## 2024-08-01 PROCEDURE — 99203 OFFICE O/P NEW LOW 30 MIN: CPT | Performed by: ORTHOPAEDIC SURGERY

## 2024-08-01 RX ORDER — LIDOCAINE HYDROCHLORIDE 10 MG/ML
4 INJECTION INFILTRATION; PERINEURAL
Status: COMPLETED | OUTPATIENT
Start: 2024-08-01 | End: 2024-08-01

## 2024-08-01 RX ORDER — TRIAMCINOLONE ACETONIDE 40 MG/ML
1 INJECTION, SUSPENSION INTRA-ARTICULAR; INTRAMUSCULAR
Status: COMPLETED | OUTPATIENT
Start: 2024-08-01 | End: 2024-08-01

## 2024-08-01 ASSESSMENT — PAIN - FUNCTIONAL ASSESSMENT: PAIN_FUNCTIONAL_ASSESSMENT: 0-10

## 2024-08-01 ASSESSMENT — PAIN DESCRIPTION - DESCRIPTORS: DESCRIPTORS: ACHING

## 2024-08-01 ASSESSMENT — PAIN SCALES - GENERAL: PAINLEVEL_OUTOF10: 7

## 2024-08-01 NOTE — PROGRESS NOTES
Patient is a 63-year-old female presents today for evaluation of bilateral knee arthritis.  Her right knee bothers her more than the left.  She is morbidly obese with a BMI of 43 and a pack-a-day smoker.  She has had no treatment for her knee in the recent past.  She has tried some Tylenol however.  She rates her pain as 8 out of 10.    Bilateral knees:  AAOx3, NAD, walks with a moderate antalgic gait  Varus allignment  Range of motion lacks 10 degrees of full extension and flexes to 110 degrees  Stable to varus/valgus/anterior/posterior stress through out the range of motion  Slight laxity with varus stress  Diffuse medial  joint line tenderness to palpation  Moderate effusion  SILT in a radha/saph/per/tib distribution  5/5 knee extension/df/pf/ehl  ½ dorsalis pedis and posterior tibial pulse  no popliteal lymphadenopathy  no other overlying lesions  mood: euthymic  Respirations non labored    Plain films were reviewed by myself in clinic today.  She has severe patellofemoral arthritis with complete loss of her patellofemoral joint space and lateral subluxation of her patella.    We discussed further conservative treatment today in clinic.  She is not a candidate for knee replacement secondary to her obesity and smoking history.  She would have to quit smoking before having surgery.  She would like to undergo a right knee cortisone injection today in clinic.  Hopefully this will provide some relief.  She can repeat the injection in 3 to 4 months if needed.  All of her questions were answered.    L Inj/Asp: R knee on 8/1/2024 11:34 AM  Indications: pain and joint swelling  Details: 22 G needle, anterolateral approach  Medications: 1 mL triamcinolone acetonide 40 mg/mL; 4 mL lidocaine 10 mg/mL (1 %)  Outcome: tolerated well, no immediate complications    Discussion:  I discussed the conservative treatment options for knee osteoarthritis including but not limited to physical therapy, oral NSAIDS, activity and lifestyle  modification, and corticosteroid injections. Pt has elected to undergo a cortisone injection today. I have explained the risk and benefits of an injection including the possibility of joint infection, bleeding, damage to cartilage, allergic reaction. Patient verbalized understanding and gave verbal consent wishes to proceed with a intra-articular cortisone injection for their knee.    Procedure:  After discussing the risk and benefits of the procedure, we proceeded with an intra-articular right knee injection.    With the patient's informed verbal consent, the right knee was prepped in standard sterile fashion with Chlorhexidine. The skin was then anesthetized with ethyl chloride spray and cleaned again with Chlorhexidine. The knee was then apirated/injected with a prefilled 20-gauge syringe of 40 mg Kenalog + 4 ml Lidocaine using the lateral approach without complications.  The patient tolerated this well and felt immediate initial relief of symptoms. A bandaid was applied and the patient ambulated out of the clinic on ther own accord without difficulty. Patient was instructed to avoid physical activity for 24-48 hours to prevent the knees from swelling and may ice the knees as tolerated. Patient should contact the office if any signs of of infection appear: redness, fever, chills, drainage, swelling or warmth to the knees.  Pt understands that the injections can be repeated no sooner than 3 months.    Procedure, treatment alternatives, risks and benefits explained, specific risks discussed. Consent was given by the patient. Immediately prior to procedure a time out was called to verify the correct patient, procedure, equipment, support staff and site/side marked as required. Patient was prepped and draped in the usual sterile fashion.            This note was created using voice recognition software and was not corrected for typographical or grammatical errors.

## 2024-08-02 ENCOUNTER — APPOINTMENT (OUTPATIENT)
Dept: RADIOLOGY | Facility: HOSPITAL | Age: 64
End: 2024-08-02
Payer: COMMERCIAL

## 2024-08-07 ENCOUNTER — HOSPITAL ENCOUNTER (OUTPATIENT)
Dept: RADIOLOGY | Facility: HOSPITAL | Age: 64
Discharge: HOME | End: 2024-08-07
Payer: COMMERCIAL

## 2024-08-07 VITALS — HEIGHT: 67 IN | BODY MASS INDEX: 43.79 KG/M2 | WEIGHT: 279 LBS

## 2024-08-07 DIAGNOSIS — Z12.31 ENCOUNTER FOR SCREENING MAMMOGRAM FOR MALIGNANT NEOPLASM OF BREAST: ICD-10-CM

## 2024-08-07 PROCEDURE — 77067 SCR MAMMO BI INCL CAD: CPT

## 2024-08-15 ENCOUNTER — LAB (OUTPATIENT)
Dept: LAB | Facility: LAB | Age: 64
End: 2024-08-15
Payer: COMMERCIAL

## 2024-08-15 ENCOUNTER — OFFICE VISIT (OUTPATIENT)
Dept: PRIMARY CARE | Facility: CLINIC | Age: 64
End: 2024-08-15
Payer: COMMERCIAL

## 2024-08-15 VITALS
BODY MASS INDEX: 42.13 KG/M2 | HEART RATE: 99 BPM | DIASTOLIC BLOOD PRESSURE: 90 MMHG | WEIGHT: 269 LBS | TEMPERATURE: 98.6 F | SYSTOLIC BLOOD PRESSURE: 141 MMHG

## 2024-08-15 DIAGNOSIS — I10 PRIMARY HYPERTENSION: ICD-10-CM

## 2024-08-15 DIAGNOSIS — E78.49 OTHER HYPERLIPIDEMIA: ICD-10-CM

## 2024-08-15 DIAGNOSIS — J84.10 PULMONARY FIBROSIS, UNSPECIFIED (MULTI): ICD-10-CM

## 2024-08-15 DIAGNOSIS — M25.561 CHRONIC PAIN OF BOTH KNEES: ICD-10-CM

## 2024-08-15 DIAGNOSIS — G89.29 CHRONIC PAIN OF BOTH KNEES: ICD-10-CM

## 2024-08-15 DIAGNOSIS — M25.562 CHRONIC PAIN OF BOTH KNEES: ICD-10-CM

## 2024-08-15 DIAGNOSIS — I88.9 CERVICAL LYMPHADENITIS: ICD-10-CM

## 2024-08-15 LAB
ALBUMIN SERPL BCP-MCNC: 4.1 G/DL (ref 3.4–5)
ALP SERPL-CCNC: 63 U/L (ref 33–136)
ALT SERPL W P-5'-P-CCNC: 9 U/L (ref 7–45)
ANION GAP SERPL CALC-SCNC: 14 MMOL/L (ref 10–20)
AST SERPL W P-5'-P-CCNC: 11 U/L (ref 9–39)
BILIRUB SERPL-MCNC: 0.8 MG/DL (ref 0–1.2)
BUN SERPL-MCNC: 14 MG/DL (ref 6–23)
CALCIUM SERPL-MCNC: 9.6 MG/DL (ref 8.6–10.6)
CHLORIDE SERPL-SCNC: 98 MMOL/L (ref 98–107)
CO2 SERPL-SCNC: 34 MMOL/L (ref 21–32)
CREAT SERPL-MCNC: 0.83 MG/DL (ref 0.5–1.05)
EGFRCR SERPLBLD CKD-EPI 2021: 79 ML/MIN/1.73M*2
EST. AVERAGE GLUCOSE BLD GHB EST-MCNC: 123 MG/DL
GLUCOSE SERPL-MCNC: 87 MG/DL (ref 74–99)
HBA1C MFR BLD: 5.9 %
POTASSIUM SERPL-SCNC: 3.7 MMOL/L (ref 3.5–5.3)
PROT SERPL-MCNC: 6.7 G/DL (ref 6.4–8.2)
SODIUM SERPL-SCNC: 142 MMOL/L (ref 136–145)

## 2024-08-15 PROCEDURE — 3077F SYST BP >= 140 MM HG: CPT | Performed by: INTERNAL MEDICINE

## 2024-08-15 PROCEDURE — 3080F DIAST BP >= 90 MM HG: CPT | Performed by: INTERNAL MEDICINE

## 2024-08-15 PROCEDURE — 80053 COMPREHEN METABOLIC PANEL: CPT

## 2024-08-15 PROCEDURE — 83036 HEMOGLOBIN GLYCOSYLATED A1C: CPT

## 2024-08-15 PROCEDURE — 36415 COLL VENOUS BLD VENIPUNCTURE: CPT

## 2024-08-15 PROCEDURE — 99214 OFFICE O/P EST MOD 30 MIN: CPT | Performed by: INTERNAL MEDICINE

## 2024-08-15 RX ORDER — HYDROXYZINE HYDROCHLORIDE 25 MG/1
25 TABLET, FILM COATED ORAL 3 TIMES DAILY
Qty: 30 TABLET | Refills: 0 | Status: SHIPPED | OUTPATIENT
Start: 2024-08-15 | End: 2024-08-25

## 2024-08-15 ASSESSMENT — ENCOUNTER SYMPTOMS
WEAKNESS: 0
MYALGIAS: 0
CONSTIPATION: 0
CONFUSION: 0
ARTHRALGIAS: 0
DIZZINESS: 0
ABDOMINAL PAIN: 0
COUGH: 0
SLEEP DISTURBANCE: 0
UNEXPECTED WEIGHT CHANGE: 0
DIARRHEA: 0
NECK PAIN: 0
DYSURIA: 0
NERVOUS/ANXIOUS: 0
CHEST TIGHTNESS: 0
FREQUENCY: 0
DIFFICULTY URINATING: 0
FATIGUE: 0
BLOOD IN STOOL: 0
WHEEZING: 0
SORE THROAT: 0
SINUS PAIN: 0
HEADACHES: 0
BACK PAIN: 0
DECREASED CONCENTRATION: 0
FEVER: 0
PALPITATIONS: 0
FLANK PAIN: 0
LIGHT-HEADEDNESS: 0
CHILLS: 0
SHORTNESS OF BREATH: 0

## 2024-08-15 NOTE — PROGRESS NOTES
Subjective   Patient ID: Kandy Finn is a 63 y.o. female who presents for Follow-up (Pt present today for swollen left side of tongue. ls).    HPI patient is seen today for complaints of swelling in her neck on the left side for last 24 hours after she ate a piece of steak. She is not aware of any food allergies. She complains of slight difficulty swallowing and discomfort. She feels well otherwise. She has been eating healthy and has lost 10 pounds since her last visit. She is taking all medications as prescribed. She denies any other concerns.    Review of Systems   Constitutional:  Negative for chills, fatigue, fever and unexpected weight change.   HENT:  Negative for congestion, postnasal drip, sinus pain and sore throat.         Neck swelling   Eyes:  Negative for visual disturbance.   Respiratory:  Negative for cough, chest tightness, shortness of breath and wheezing.    Cardiovascular:  Negative for chest pain, palpitations and leg swelling.   Gastrointestinal:  Negative for abdominal pain, blood in stool, constipation and diarrhea.   Genitourinary:  Negative for difficulty urinating, dysuria, flank pain and frequency.   Musculoskeletal:  Negative for arthralgias, back pain, gait problem, myalgias and neck pain.   Skin:  Negative for rash.   Neurological:  Negative for dizziness, weakness, light-headedness and headaches.   Psychiatric/Behavioral:  Negative for confusion, decreased concentration and sleep disturbance. The patient is not nervous/anxious.        Objective   /90   Pulse 99   Temp 37 °C (98.6 °F)   Wt 122 kg (269 lb)   BMI 42.13 kg/m²     Physical Exam  Vitals reviewed.   Constitutional:       General: She is not in acute distress.     Appearance: Normal appearance.   HENT:      Head: Normocephalic and atraumatic.   Cardiovascular:      Rate and Rhythm: Normal rate and regular rhythm.      Pulses: Normal pulses.   Pulmonary:      Effort: Pulmonary effort is normal. No respiratory  distress.      Breath sounds: Normal breath sounds.   Abdominal:      General: Bowel sounds are normal. There is no distension.      Tenderness: There is no abdominal tenderness.   Musculoskeletal:         General: No swelling. Normal range of motion.      Cervical back: Normal range of motion. Tenderness present.      Comments: Tenderness and edema in anterior cervical and submandibular area   Skin:     General: Skin is warm.   Neurological:      General: No focal deficit present.      Mental Status: She is alert.      Coordination: Coordination normal.      Gait: Gait normal.   Psychiatric:         Mood and Affect: Mood normal.         Behavior: Behavior normal.         Assessment/Plan   Diagnoses and all orders for this visit:  BMI 40.0-44.9, adult (Multi)  Comments:  BMI is improving  continue healthy diet and regular exercise  Pulmonary fibrosis, unspecified (Multi)  Primary hypertension  Comments:  Continue amlodipine and HCTZ  Other hyperlipidemia  Comments:  Continue healthy diet and regular exercise  continues simvastatin  Cervical lymphadenitis  Comments:  Acute cervical lymphadenitis possibly a reaction from food  start hydroxyzine as prescribed  Orders:  -     hydrOXYzine HCL (Atarax) 25 mg tablet; Take 1 tablet (25 mg) by mouth 3 times a day for 10 days.  Chronic pain of both knees  Comments:  Handicap placard renewed  Orders:  -     Disability Placard    Follow-up as needed all in 3 to 4 months

## 2024-09-06 ENCOUNTER — HOSPITAL ENCOUNTER (OUTPATIENT)
Dept: RADIOLOGY | Facility: HOSPITAL | Age: 64
End: 2024-09-06
Payer: COMMERCIAL

## 2024-09-10 DIAGNOSIS — Z12.11 COLON CANCER SCREENING: Primary | ICD-10-CM

## 2024-09-10 RX ORDER — POLYETHYLENE GLYCOL 3350, SODIUM SULFATE ANHYDROUS, SODIUM BICARBONATE, SODIUM CHLORIDE, POTASSIUM CHLORIDE 236; 22.74; 6.74; 5.86; 2.97 G/4L; G/4L; G/4L; G/4L; G/4L
4000 POWDER, FOR SOLUTION ORAL ONCE
Qty: 4000 ML | Refills: 0 | Status: SHIPPED | OUTPATIENT
Start: 2024-09-10 | End: 2024-09-10

## 2024-09-10 NOTE — PROGRESS NOTES
Bowel preparation has been prescribed for patient's upcoming colonoscopy procedure.    Wm Winchester MD  Gastroenterology

## 2024-09-13 ENCOUNTER — ANESTHESIA (OUTPATIENT)
Dept: GASTROENTEROLOGY | Facility: HOSPITAL | Age: 64
End: 2024-09-13
Payer: COMMERCIAL

## 2024-09-13 ENCOUNTER — ANESTHESIA EVENT (OUTPATIENT)
Dept: GASTROENTEROLOGY | Facility: HOSPITAL | Age: 64
End: 2024-09-13
Payer: COMMERCIAL

## 2024-09-13 ENCOUNTER — HOSPITAL ENCOUNTER (OUTPATIENT)
Dept: GASTROENTEROLOGY | Facility: HOSPITAL | Age: 64
Setting detail: OUTPATIENT SURGERY
Discharge: HOME | End: 2024-09-13
Payer: COMMERCIAL

## 2024-09-13 VITALS
RESPIRATION RATE: 16 BRPM | BODY MASS INDEX: 43 KG/M2 | TEMPERATURE: 97 F | WEIGHT: 274 LBS | SYSTOLIC BLOOD PRESSURE: 138 MMHG | DIASTOLIC BLOOD PRESSURE: 84 MMHG | HEIGHT: 67 IN | HEART RATE: 68 BPM | OXYGEN SATURATION: 95 %

## 2024-09-13 DIAGNOSIS — R14.0 ABDOMINAL BLOATING: ICD-10-CM

## 2024-09-13 DIAGNOSIS — K21.00 GASTROESOPHAGEAL REFLUX DISEASE WITH ESOPHAGITIS WITHOUT HEMORRHAGE: ICD-10-CM

## 2024-09-13 DIAGNOSIS — K58.2 IRRITABLE BOWEL SYNDROME WITH BOTH CONSTIPATION AND DIARRHEA: ICD-10-CM

## 2024-09-13 PROCEDURE — 3700000002 HC GENERAL ANESTHESIA TIME - EACH INCREMENTAL 1 MINUTE

## 2024-09-13 PROCEDURE — 2500000005 HC RX 250 GENERAL PHARMACY W/O HCPCS: Mod: SE | Performed by: ANESTHESIOLOGIST ASSISTANT

## 2024-09-13 PROCEDURE — 3700000001 HC GENERAL ANESTHESIA TIME - INITIAL BASE CHARGE

## 2024-09-13 PROCEDURE — 2720000007 HC OR 272 NO HCPCS

## 2024-09-13 PROCEDURE — 45385 COLONOSCOPY W/LESION REMOVAL: CPT | Performed by: INTERNAL MEDICINE

## 2024-09-13 PROCEDURE — 43239 EGD BIOPSY SINGLE/MULTIPLE: CPT | Performed by: INTERNAL MEDICINE

## 2024-09-13 PROCEDURE — 2500000004 HC RX 250 GENERAL PHARMACY W/ HCPCS (ALT 636 FOR OP/ED): Mod: SE | Performed by: ANESTHESIOLOGIST ASSISTANT

## 2024-09-13 RX ORDER — LABETALOL HYDROCHLORIDE 5 MG/ML
INJECTION, SOLUTION INTRAVENOUS AS NEEDED
Status: DISCONTINUED | OUTPATIENT
Start: 2024-09-13 | End: 2024-09-13

## 2024-09-13 RX ORDER — HYDROMORPHONE HYDROCHLORIDE 1 MG/ML
0.2 INJECTION, SOLUTION INTRAMUSCULAR; INTRAVENOUS; SUBCUTANEOUS EVERY 5 MIN PRN
OUTPATIENT
Start: 2024-09-13

## 2024-09-13 RX ORDER — FENTANYL CITRATE 50 UG/ML
INJECTION, SOLUTION INTRAMUSCULAR; INTRAVENOUS AS NEEDED
Status: DISCONTINUED | OUTPATIENT
Start: 2024-09-13 | End: 2024-09-13

## 2024-09-13 RX ORDER — PROPOFOL 10 MG/ML
INJECTION, EMULSION INTRAVENOUS CONTINUOUS PRN
Status: DISCONTINUED | OUTPATIENT
Start: 2024-09-13 | End: 2024-09-13

## 2024-09-13 RX ORDER — HYDROMORPHONE HYDROCHLORIDE 1 MG/ML
0.5 INJECTION, SOLUTION INTRAMUSCULAR; INTRAVENOUS; SUBCUTANEOUS EVERY 5 MIN PRN
OUTPATIENT
Start: 2024-09-13

## 2024-09-13 RX ORDER — ONDANSETRON HYDROCHLORIDE 2 MG/ML
4 INJECTION, SOLUTION INTRAVENOUS ONCE AS NEEDED
OUTPATIENT
Start: 2024-09-13

## 2024-09-13 RX ORDER — MIDAZOLAM HYDROCHLORIDE 1 MG/ML
INJECTION INTRAMUSCULAR; INTRAVENOUS AS NEEDED
Status: DISCONTINUED | OUTPATIENT
Start: 2024-09-13 | End: 2024-09-13

## 2024-09-13 RX ORDER — DROPERIDOL 2.5 MG/ML
0.62 INJECTION, SOLUTION INTRAMUSCULAR; INTRAVENOUS ONCE AS NEEDED
OUTPATIENT
Start: 2024-09-13

## 2024-09-13 RX ORDER — LIDOCAINE HYDROCHLORIDE 20 MG/ML
INJECTION, SOLUTION INFILTRATION; PERINEURAL AS NEEDED
Status: DISCONTINUED | OUTPATIENT
Start: 2024-09-13 | End: 2024-09-13

## 2024-09-13 RX ORDER — ALBUTEROL SULFATE 0.83 MG/ML
2.5 SOLUTION RESPIRATORY (INHALATION) ONCE AS NEEDED
OUTPATIENT
Start: 2024-09-13

## 2024-09-13 RX ORDER — SODIUM CHLORIDE, SODIUM LACTATE, POTASSIUM CHLORIDE, CALCIUM CHLORIDE 600; 310; 30; 20 MG/100ML; MG/100ML; MG/100ML; MG/100ML
100 INJECTION, SOLUTION INTRAVENOUS CONTINUOUS
OUTPATIENT
Start: 2024-09-13

## 2024-09-13 ASSESSMENT — COLUMBIA-SUICIDE SEVERITY RATING SCALE - C-SSRS
6. HAVE YOU EVER DONE ANYTHING, STARTED TO DO ANYTHING, OR PREPARED TO DO ANYTHING TO END YOUR LIFE?: NO
1. IN THE PAST MONTH, HAVE YOU WISHED YOU WERE DEAD OR WISHED YOU COULD GO TO SLEEP AND NOT WAKE UP?: NO
2. HAVE YOU ACTUALLY HAD ANY THOUGHTS OF KILLING YOURSELF?: NO

## 2024-09-13 ASSESSMENT — PAIN SCALES - GENERAL
PAINLEVEL_OUTOF10: 0 - NO PAIN
PAIN_LEVEL: 0
PAINLEVEL_OUTOF10: 0 - NO PAIN

## 2024-09-13 ASSESSMENT — PAIN - FUNCTIONAL ASSESSMENT
PAIN_FUNCTIONAL_ASSESSMENT: 0-10

## 2024-09-13 NOTE — H&P
History Of Present Illness  Kandy Finn is a 63 y.o. female with history of GERD and diarrhea who presents for EGD and colonoscopy      Past Medical History  Past Medical History:   Diagnosis Date    Asthma ([facility])     Contact with and (suspected) exposure to infections with a predominantly sexual mode of transmission 04/03/2017    Exposure to STD    COPD (chronic obstructive pulmonary disease) (Multi)     GERD (gastroesophageal reflux disease)     Hyperlipidemia     Hypertension     Lumbar radiculopathy, chronic 04/15/2024    Personal history of diseases of the skin and subcutaneous tissue 03/15/2016    History of folliculitis    Personal history of other diseases of the digestive system 01/16/2018    History of constipation    Personal history of other diseases of the nervous system and sense organs 07/07/2020    History of conjunctivitis    Personal history of other diseases of the respiratory system 03/06/2017    History of acute bronchitis    Personal history of other specified conditions 08/30/2018    History of diarrhea    Personal history of other specified conditions 03/23/2020    History of abdominal pain    Sleep apnea     Unspecified abdominal pain 10/12/2017    Abdominal wall pain     Surgical History  Past Surgical History:   Procedure Laterality Date    OTHER SURGICAL HISTORY  10/23/2017    Enterectomy     Social History  She reports that she has been smoking cigarettes. She has been exposed to tobacco smoke. She has never used smokeless tobacco. She reports that she does not drink alcohol and does not use drugs.    Family History  No family history on file.     Allergies  Allergies   Allergen Reactions    Penicillins Anaphylaxis, Swelling, Unknown, Hives and Itching     rash.itching    Aspirin Hives, Itching and Swelling    Lisinopril Angioedema and Swelling     Review of Systems   All other systems reviewed and are negative.       Physical Exam  Eyes:      General: No scleral  "icterus.  Cardiovascular:      Rate and Rhythm: Normal rate and regular rhythm.   Pulmonary:      Effort: Pulmonary effort is normal.   Abdominal:      General: Abdomen is flat. There is no distension.      Palpations: Abdomen is soft.      Tenderness: There is no abdominal tenderness.   Skin:     General: Skin is warm.      Capillary Refill: Capillary refill takes less than 2 seconds.   Neurological:      General: No focal deficit present.      Mental Status: She is alert and oriented to person, place, and time.   Psychiatric:         Mood and Affect: Mood normal.         Thought Content: Thought content normal.         Judgment: Judgment normal.          Last Recorded Vitals  Blood pressure (!) 152/107, pulse 76, temperature 36.3 °C (97.3 °F), temperature source Temporal, resp. rate 20, height 1.702 m (5' 7\"), weight 124 kg (274 lb), SpO2 95%.    Assessment/Plan   Will proceed with diagnostic EGD and colonoscopy     Puma Long MD  "

## 2024-09-13 NOTE — Clinical Note
ARABELLA quispe  Lot number:[id number]  Expiration date:10/31/2025  Pre skin: intact   Post skin: intact

## 2024-09-13 NOTE — ANESTHESIA PREPROCEDURE EVALUATION
Patient: Kandy Finn    Procedure Information       Date/Time: 09/13/24 1400    Scheduled providers: Wm Winchester MD; Dorian Diamond MD    Procedures:       EGD      COLONOSCOPY    Location: [facility]        ALLERGIES:  Allergies   Allergen Reactions   • Penicillins Anaphylaxis, Swelling, Unknown, Hives and Itching     rash.itching   • Aspirin Hives, Itching and Swelling   • Lisinopril Angioedema and Swelling        MEDICAL HISTORY:  Past Medical History:   Diagnosis Date   • Contact with and (suspected) exposure to infections with a predominantly sexual mode of transmission 04/03/2017    Exposure to STD   • Lumbar radiculopathy, chronic 04/15/2024   • Personal history of diseases of the skin and subcutaneous tissue 03/15/2016    History of folliculitis   • Personal history of other diseases of the digestive system 01/16/2018    History of constipation   • Personal history of other diseases of the nervous system and sense organs 07/07/2020    History of conjunctivitis   • Personal history of other diseases of the respiratory system 03/06/2017    History of acute bronchitis   • Personal history of other specified conditions 08/30/2018    History of diarrhea   • Personal history of other specified conditions 03/23/2020    History of abdominal pain   • Unspecified abdominal pain 10/12/2017    Abdominal wall pain        Relevant Problems   Anesthesia (within normal limits)      Cardiac   (+) Benign essential hypertension   (+) Hyperlipidemia   (+) Rib pain      Pulmonary   (+) Asthma-chronic obstructive pulmonary disease overlap syndrome (Multi)   (+) Chronic obstructive pulmonary disease (Multi)   (+) Mild persistent asthma without complication ([facility]-HCC)   (+) Obstructive sleep apnea (adult) (pediatric)      Neuro   (+) Paranoid schizophrenia (Multi)      GI   (+) Gastroesophageal reflux disease   (+) Irritable bowel syndrome   (+) Paraesophageal hernia      /Renal (within normal limits)       Liver (within normal limits)      Endocrine   (+) Obesity      Musculoskeletal   (+) Chronic low back pain      ID   (+) Disease due to severe acute respiratory syndrome coronavirus 2 (SARS-CoV-2)      Skin   (+) Atopic dermatitis, unspecified   (+) Rash      Respiratory   (+) Pulmonary fibrosis, unspecified (Multi)        SURGICAL HISTORY:  Past Surgical History:   Procedure Laterality Date   • OTHER SURGICAL HISTORY  10/23/2017    Enterectomy      No anesthesia complications.  Pt is NPO after midnight.    MEDICATIONS:  Current Outpatient Medications   Medication Instructions   • albuterol 2.5 mg /3 mL (0.083 %) nebulizer solution INHALE 1 VIAL VIA NEBULIZER EVERY 4 TO 6 HOURS AS NEEDED FOR WHEEZINGOR SHORTNESS OF BREATH   • albuterol 90 mcg/actuation inhaler 2 puffs, inhalation, Every 4 hours PRN   • amLODIPine (NORVASC) 5 mg, oral, Daily   • benztropine (Cogentin) 2 mg tablet 1 tablet, oral, Nightly   • budesonide-glycopyr-formoterol (BREZTRI) 160-9-4.8 mcg/actuation HFA aerosol inhaler 2 puffs, inhalation, 2 times daily RT   • fluPHENAZine decanoate (PROLIXIN) 50 mg, intramuscular, Every 21 days   • fluticasone propion-salmeteroL (AirDuo RespiClick) 113-14 mcg/actuation inhaler One inhalation daily   • hydroCHLOROthiazide (HYDRODIURIL) 25 mg, oral, Daily   • hydrOXYzine HCL (ATARAX) 25 mg, oral, 3 times daily   • hydrOXYzine pamoate (VISTARIL) 25 mg, oral, Daily PRN   • losartan (COZAAR) 100 mg, oral, Daily RT   • nicotine 10 mg/mL spray,non-aerosol 1 spray in each nostril: 1-2 times per hour (up to 5 times per hour, or 40 times per day)   • omeprazole (PRILOSEC) 40 mg, oral, Daily before breakfast, Do not crush or chew.   • oxybutynin (DITROPAN) 5 mg, oral, 4 times daily PRN   • potassium chloride CR (Klor-Con) 10 mEq ER tablet 10 mEq, oral, Daily, Do not crush, chew, or split.   • simvastatin (ZOCOR) 20 mg, oral, Nightly   • sodium chloride (Ayr) 0.65 % nasal drops 1 drop, nasal, 2 times daily PRN   •  "triamcinolone (Kenalog) 0.025 % ointment 1 Application, Topical, 2 times daily PRN   • varenicline (Chantix BERTA) 0.5 mg (11)- 1 mg (42) tablet Days 1 to 3: 0.5 milligram (mg) once a day. Days 4 to 7: 0.5 mg 2 times per day, one in the morning and one in the evening. Days 8 to end of treatment: 1 mg 2 times per day.   • varenicline (Chantix) 1 mg tablet Take one 1 mg tablet in the morning and one 1 mg tablet in the evening.        VITALS:      8/15/2024     9:30 AM 8/7/2024     2:11 PM 7/18/2024    11:47 AM   Vitals   Systolic 141  129   Diastolic 90  84   Heart Rate 99     Temp 37 °C (98.6 °F)  36.8 °C (98.2 °F)   Height (in)  1.702 m (5' 7\")    Weight (lb) 269 279 275.4   BMI 42.13 kg/m2 43.7 kg/m2 43.13 kg/m2   BSA (m2) 2.4 m2 2.45 m2 2.43 m2   Visit Report Report  Report       LABS:   BMP   Lab Results   Component Value Date    GLUCOSE 87 08/15/2024    CALCIUM 9.6 08/15/2024     08/15/2024    K 3.7 08/15/2024    CO2 34 (H) 08/15/2024    CL 98 08/15/2024    BUN 14 08/15/2024    CREATININE 0.83 08/15/2024   , LFT   Lab Results   Component Value Date    ALT 9 08/15/2024    AST 11 08/15/2024    ALKPHOS 63 08/15/2024    BILITOT 0.8 08/15/2024   , CBC  Lab Results   Component Value Date    WBC 4.5 04/15/2024    HGB 14.1 04/15/2024    HCT 45.4 04/15/2024    MCV 94 04/15/2024     04/15/2024          , Coags   Lab Results   Component Value Date/Time    PROTIME 11.8 11/28/2017 1134    INR 1.1 11/28/2017 1134    APTT 35 11/28/2017 1134      , A1C   Lab Results   Component Value Date    HGBA1C 5.9 (H) 08/15/2024       IMAGES:  EKG          Encounter Date: 04/25/24   ECG 12 lead (Clinic Performed)   Result Value    Ventricular Rate 84    Atrial Rate 84    WI Interval 174    QRS Duration 96    QT Interval 414    QTC Calculation(Bazett) 489    P Axis 57    R Axis -12    T Axis 47    QRS Count 14    Q Onset 209    P Onset 122    P Offset 186    T Offset 416    QTC Fredericia 462    Narrative    Normal sinus " rhythm  Moderate voltage criteria for LVH, may be normal variant  Borderline ECG  When compared with ECG of 28-FEB-2024 10:20,  No significant change was found  Confirmed by Emory Keenan ([id number]) on 4/26/2024 9:01:20 AM      , ECHO       No results found for this or any previous visit from the past 730 days.    , CARDIAC CATH      No results found for this or any previous visit from the past 730 days.   , CXR       XR chest 1 view 02/28/2024    Narrative  STUDY:  Chest Radiograph;  2/28/2024 at 1115 hours  INDICATION:  Cough.  COMPARISON:  8/10/2022 CT Chest without Contrast, 10/16/2021 Chest XR.  ACCESSION NUMBER(S):  [id number]  ORDERING CLINICIAN:  LAKSHMI WESTFALL  TECHNIQUE:  Frontal chest was obtained at 1115 hours.  FINDINGS:  CARDIOMEDIASTINAL SILHOUETTE:  The heart size appears enlarged.  LUNGS:  Lungs are clear.  Mild prominence of the pulmonary interstitium.    ABDOMEN:  No remarkable upper abdominal findings.    BONES:  No acute osseous changes.    Impression  Cardiomegaly.  Mild prominence of the interstitium.  Correlate  clinically for component of fluid overload.  Signed by Neri Saucedo DO    , CT Head/Neck     No results found for this or any previous visit from the past 760 days.    , CT Chest        CT cardiac scoring wo IV contrast 07/16/2024    Narrative  Interpreted By:  Jazmyn Palacios,  STUDY:  CT CARDIAC SCORING WO IV CONTRAST;  7/16/2024 3:24 pm    INDICATION:  Signs/Symptoms:Intermediate ASCVD risk (HTN, HLD, Smoker, Obese)  w/atypical Chest Pain and family hx of ASCVD.    COMPARISON:  None....    ACCESSION NUMBER(S):  [id number]    ORDERING CLINICIAN:  KAT ZAMORA    TECHNIQUE:  Using prospective ECG gating, CT scan of the coronary arteries was  performed without intravenous contrast. Coronary calcium scoring  was  performed according to the method of Agatston.    FINDINGS:  The score and distribution of calcium in the coronary arteries is as  follows:    LM 0,  LAD 6.9,  LCx  "0,  RCA 0,    Total 6.9    The visualized mid/lower ascending thoracic aorta measures 3.7 cm in  diameter. The heart is normal in size. No pericardial effusion is  present.    No gross evidence of mediastinal or hilar lymphadenopathy or masses  is identified. The visualized segments of the lungs are normally  expanded.    The visualized subdiaphragmatic structures appear intact.    Impression  1. Coronary artery calcium score of 6.9*.    *Coronary artery calcium scoring may be helpful in predicting the  risk for future coronary heart disease events.  According to the  American College of Cardiology Foundation Clinical Expert Consensus  Task Force, such testing provides important prognostic information in  patients with more than one coronary heart disease risk factor. The  coronary artery calcium score correlates with the annual risk of a  non-fatal myocardial infarction or coronary heart disease death.    Coronary artery score            Annual Risk    0-99                             0.4%  100-399                        1.3%  >400                            2.4%    These three \"breakpoints\" correspond to lower, intermediate and high  risk states for future coronary events.  Such information should be  used, along with appropriate clinical judgment, to make decisions  regarding the intensity of risk factor management strategies to treat  blood lipids and to modify other non-lipid coronary risk factors.    Reference: Kingfisher P et al. Circulation.  2007; 115:402-426    MACRO:  None    Signed by: Jazmyn Ricci 7/16/2024 5:06 PM  Dictation workstation:   [id number]   , CT Abdomin     No results found for this or any previous visit from the past 730 days.    SOCIAL:  Social History     Tobacco Use   Smoking Status Every Day   • Types: Cigarettes   • Passive exposure: Past   Smokeless Tobacco Never      Social History     Substance and Sexual Activity   Alcohol Use Never      Social History     Substance and " Sexual Activity   Drug Use Never        NPO STATUS:  No data recorded    Clinical Areas Reviewed:                   Anesthesia Assessment:    Physical Exam    Airway  Mallampati: III  TM distance: >3 FB  Neck ROM: full     Cardiovascular - normal exam     Dental   (+) upper dentures, lower dentures     Pulmonary - normal exam     Abdominal - normal exam           Anesthesia Plan    History of general anesthesia?: yes  History of complications of general anesthesia?: no    ASA 3     MAC     intravenous induction   Postoperative administration of opioids is intended.  Anesthetic plan and risks discussed with patient.  Use of blood products discussed with patient who.    Plan discussed with CAA and attending.

## 2024-09-13 NOTE — ANESTHESIA POSTPROCEDURE EVALUATION
Patient: Kandy Finn    Procedure Summary       Date: 09/13/24 Room / Location: [facility]    Anesthesia Start: 1113 Anesthesia Stop: 1244    Procedures:       EGD      COLONOSCOPY Diagnosis:       Abdominal bloating      Gastroesophageal reflux disease with esophagitis without hemorrhage      Irritable bowel syndrome with both constipation and diarrhea    Scheduled Providers: Wm Winchester MD Responsible Provider: Dorian Diamond MD    Anesthesia Type: MAC ASA Status: 3            Anesthesia Type: MAC    Vitals Value Taken Time   /84 09/13/24 1310   Temp 36.1 °C (97 °F) 09/13/24 1240   Pulse 68 09/13/24 1310   Resp 16 09/13/24 1310   SpO2 95 % 09/13/24 1310       Anesthesia Post Evaluation    Patient location during evaluation: PACU  Patient participation: complete - patient participated  Level of consciousness: awake  Pain score: 0  Pain management: adequate  Airway patency: patent  Cardiovascular status: acceptable  Respiratory status: acceptable  Hydration status: acceptable  Postoperative Nausea and Vomiting: none    No notable events documented.

## 2024-09-13 NOTE — ADDENDUM NOTE
Encounter addended by: Audrey Gaytan RN on: 9/13/2024 3:15 PM   Actions taken: Check Out activity completed

## 2024-09-17 LAB
LABORATORY COMMENT REPORT: NORMAL
PATH REPORT.FINAL DX SPEC: NORMAL
PATH REPORT.GROSS SPEC: NORMAL
PATH REPORT.TOTAL CANCER: NORMAL
RESIDENT REVIEW: NORMAL

## 2024-09-26 ENCOUNTER — TELEPHONE (OUTPATIENT)
Dept: GASTROENTEROLOGY | Facility: HOSPITAL | Age: 64
End: 2024-09-26
Payer: COMMERCIAL

## 2024-10-01 ENCOUNTER — APPOINTMENT (OUTPATIENT)
Dept: PRIMARY CARE | Facility: CLINIC | Age: 64
End: 2024-10-01
Payer: COMMERCIAL

## 2024-10-01 VITALS
BODY MASS INDEX: 41.82 KG/M2 | WEIGHT: 267 LBS | TEMPERATURE: 97.7 F | HEART RATE: 71 BPM | SYSTOLIC BLOOD PRESSURE: 142 MMHG | DIASTOLIC BLOOD PRESSURE: 96 MMHG

## 2024-10-01 DIAGNOSIS — J84.10 PULMONARY FIBROSIS, UNSPECIFIED (MULTI): ICD-10-CM

## 2024-10-01 DIAGNOSIS — N32.81 OVERACTIVE BLADDER: ICD-10-CM

## 2024-10-01 DIAGNOSIS — I10 PRIMARY HYPERTENSION: ICD-10-CM

## 2024-10-01 DIAGNOSIS — R73.03 PREDIABETES: ICD-10-CM

## 2024-10-01 DIAGNOSIS — F20.0 PARANOID SCHIZOPHRENIA (MULTI): ICD-10-CM

## 2024-10-01 DIAGNOSIS — K21.00 GASTROESOPHAGEAL REFLUX DISEASE WITH ESOPHAGITIS WITHOUT HEMORRHAGE: Primary | ICD-10-CM

## 2024-10-01 PROCEDURE — 3080F DIAST BP >= 90 MM HG: CPT | Performed by: INTERNAL MEDICINE

## 2024-10-01 PROCEDURE — 3077F SYST BP >= 140 MM HG: CPT | Performed by: INTERNAL MEDICINE

## 2024-10-01 PROCEDURE — 99213 OFFICE O/P EST LOW 20 MIN: CPT | Performed by: INTERNAL MEDICINE

## 2024-10-01 RX ORDER — OXYBUTYNIN CHLORIDE 5 MG/1
5 TABLET ORAL 2 TIMES DAILY
Qty: 60 TABLET | Refills: 2 | Status: SHIPPED | OUTPATIENT
Start: 2024-10-01

## 2024-10-01 RX ORDER — HYDROCHLOROTHIAZIDE 25 MG/1
25 TABLET ORAL DAILY
Qty: 90 TABLET | Refills: 1 | Status: SHIPPED | OUTPATIENT
Start: 2024-10-01 | End: 2025-03-30

## 2024-10-01 RX ORDER — LOSARTAN POTASSIUM 100 MG/1
100 TABLET ORAL
Qty: 90 TABLET | Refills: 1 | Status: SHIPPED | OUTPATIENT
Start: 2024-10-01

## 2024-10-01 RX ORDER — AMLODIPINE BESYLATE 5 MG/1
5 TABLET ORAL DAILY
Qty: 90 TABLET | Refills: 1 | Status: SHIPPED | OUTPATIENT
Start: 2024-10-01

## 2024-10-01 ASSESSMENT — ENCOUNTER SYMPTOMS
NECK PAIN: 0
BLOOD IN STOOL: 0
TROUBLE SWALLOWING: 1
NERVOUS/ANXIOUS: 0
ARTHRALGIAS: 0
HEADACHES: 0
MYALGIAS: 0
SHORTNESS OF BREATH: 0
PALPITATIONS: 0
DYSURIA: 0
CHILLS: 0
DIARRHEA: 0
SORE THROAT: 1
WEAKNESS: 0
ABDOMINAL PAIN: 1
FATIGUE: 0
FREQUENCY: 0
DIZZINESS: 0
BACK PAIN: 0
CONFUSION: 0
COUGH: 0
SINUS PAIN: 0
FEVER: 0
CONSTIPATION: 0

## 2024-10-01 NOTE — PROGRESS NOTES
Subjective   Patient ID: Kandy Finn is a 63 y.o. female who presents for Follow-up (Pt present today for stomach pain. ls).    HPI patient is seen today for routine follow-up.  She had EGD and colonoscopy done recently.  EGD shows esophagitis with erosion, hiatal hernia.  She is currently on omeprazole.  Biopsy-negative for H. pylori.  Colonoscopy was normal.  She has hypertension, asthma/COPD, RODRIGUEZ, paranoid schizophrenia.    Review of Systems   Constitutional:  Negative for chills, fatigue and fever.   HENT:  Positive for sore throat and trouble swallowing. Negative for congestion and sinus pain.    Respiratory:  Negative for cough and shortness of breath.    Cardiovascular:  Negative for chest pain, palpitations and leg swelling.   Gastrointestinal:  Positive for abdominal pain. Negative for blood in stool, constipation and diarrhea.   Genitourinary:  Negative for dysuria and frequency.   Musculoskeletal:  Negative for arthralgias, back pain, myalgias and neck pain.   Neurological:  Negative for dizziness, weakness and headaches.   Psychiatric/Behavioral:  Negative for confusion. The patient is not nervous/anxious.        Objective   BP (!) 142/96 (BP Location: Left arm)   Pulse 71   Temp 36.5 °C (97.7 °F)   Wt 121 kg (267 lb)   BMI 41.82 kg/m²     Physical Exam  Vitals reviewed.   Constitutional:       General: She is not in acute distress.     Appearance: Normal appearance.   HENT:      Head: Normocephalic and atraumatic.   Cardiovascular:      Rate and Rhythm: Normal rate and regular rhythm.      Pulses: Normal pulses.   Pulmonary:      Effort: Pulmonary effort is normal. No respiratory distress.      Breath sounds: Normal breath sounds.   Abdominal:      General: Bowel sounds are normal. There is no distension.      Tenderness: There is no abdominal tenderness.   Musculoskeletal:         General: No swelling or tenderness. Normal range of motion.   Skin:     General: Skin is warm.   Neurological:       General: No focal deficit present.      Mental Status: She is alert.      Coordination: Coordination normal.      Gait: Gait normal.   Psychiatric:         Mood and Affect: Mood normal.         Behavior: Behavior normal.         Assessment/Plan   Diagnoses and all orders for this visit:  Gastroesophageal reflux disease with esophagitis without hemorrhage  Comments:  Continue omeprazole  Follow-up with gastroenterology as scheduled  Primary hypertension  Comments:  Continue amlodipine,losartan and hydrochlorothiazide  Eat healthy, exercise regularly  Orders:  -     amLODIPine (Norvasc) 5 mg tablet; Take 1 tablet (5 mg) by mouth once daily.  -     hydroCHLOROthiazide (HYDRODiuril) 25 mg tablet; Take 1 tablet (25 mg) by mouth once daily.  -     losartan (Cozaar) 100 mg tablet; Take 1 tablet (100 mg) by mouth once daily.  Pulmonary fibrosis, unspecified (Multi)  Comments:  Stable  Paranoid schizophrenia (Multi)  Comments:  Followed by psychiatry  Continue with Cogentin  Overactive bladder  Comments:  Continue oxybutynin-lower to twice a day  Orders:  -     oxybutynin (Ditropan) 5 mg tablet; Take 1 tablet (5 mg) by mouth 2 times a day.  Prediabetes  Comments:  Recent hemoglobin A1c 5.9

## 2024-10-25 NOTE — RESULT ENCOUNTER NOTE
Dear Kandy,    All of the colon polyps that were resected during your recent colonoscopy were benign hyperplastic polyps.  Biopsies obtained from your colon showed no significant abnormality.  Biopsies obtained from your stomach showed non-specific inflammation.  These findings are unlikely to explain your symptoms.  Recommend repeat colonoscopy in 5 years for routine surveillance.  Follow-up in clinic in 2-3 months.    Sincerely,  Juan Rm MD

## 2024-11-07 ENCOUNTER — APPOINTMENT (OUTPATIENT)
Dept: ORTHOPEDIC SURGERY | Facility: CLINIC | Age: 64
End: 2024-11-07
Payer: COMMERCIAL

## 2024-11-18 ENCOUNTER — APPOINTMENT (OUTPATIENT)
Dept: PRIMARY CARE | Facility: CLINIC | Age: 64
End: 2024-11-18
Payer: COMMERCIAL

## 2024-12-02 ENCOUNTER — OFFICE VISIT (OUTPATIENT)
Dept: GASTROENTEROLOGY | Facility: HOSPITAL | Age: 64
End: 2024-12-02
Payer: MEDICARE

## 2024-12-02 VITALS
DIASTOLIC BLOOD PRESSURE: 97 MMHG | OXYGEN SATURATION: 93 % | HEART RATE: 79 BPM | SYSTOLIC BLOOD PRESSURE: 145 MMHG | TEMPERATURE: 97.8 F | WEIGHT: 271.9 LBS | BODY MASS INDEX: 42.59 KG/M2

## 2024-12-02 DIAGNOSIS — K59.09 CHRONIC CONSTIPATION: Primary | ICD-10-CM

## 2024-12-02 DIAGNOSIS — R14.0 ABDOMINAL BLOATING: ICD-10-CM

## 2024-12-02 DIAGNOSIS — K21.9 GASTROESOPHAGEAL REFLUX DISEASE WITHOUT ESOPHAGITIS: ICD-10-CM

## 2024-12-02 DIAGNOSIS — K20.90 ESOPHAGITIS: ICD-10-CM

## 2024-12-02 PROCEDURE — 99213 OFFICE O/P EST LOW 20 MIN: CPT | Performed by: INTERNAL MEDICINE

## 2024-12-02 PROCEDURE — 3077F SYST BP >= 140 MM HG: CPT | Performed by: INTERNAL MEDICINE

## 2024-12-02 PROCEDURE — 1125F AMNT PAIN NOTED PAIN PRSNT: CPT | Performed by: INTERNAL MEDICINE

## 2024-12-02 PROCEDURE — 3080F DIAST BP >= 90 MM HG: CPT | Performed by: INTERNAL MEDICINE

## 2024-12-02 RX ORDER — POLYETHYLENE GLYCOL 3350 17 G/17G
17 POWDER, FOR SOLUTION ORAL DAILY
Qty: 30 PACKET | Refills: 2 | Status: SHIPPED | OUTPATIENT
Start: 2024-12-02

## 2024-12-02 RX ORDER — OMEPRAZOLE 40 MG/1
40 CAPSULE, DELAYED RELEASE ORAL
Qty: 60 CAPSULE | Refills: 1 | Status: SHIPPED | OUTPATIENT
Start: 2024-12-02

## 2024-12-02 SDOH — ECONOMIC STABILITY: FOOD INSECURITY: WITHIN THE PAST 12 MONTHS, YOU WORRIED THAT YOUR FOOD WOULD RUN OUT BEFORE YOU GOT MONEY TO BUY MORE.: NEVER TRUE

## 2024-12-02 SDOH — ECONOMIC STABILITY: FOOD INSECURITY: WITHIN THE PAST 12 MONTHS, THE FOOD YOU BOUGHT JUST DIDN'T LAST AND YOU DIDN'T HAVE MONEY TO GET MORE.: NEVER TRUE

## 2024-12-02 ASSESSMENT — LIFESTYLE VARIABLES
SKIP TO QUESTIONS 9-10: 1
AUDIT-C TOTAL SCORE: 0
HOW OFTEN DO YOU HAVE SIX OR MORE DRINKS ON ONE OCCASION: NEVER
HOW OFTEN DO YOU HAVE A DRINK CONTAINING ALCOHOL: NEVER
HOW MANY STANDARD DRINKS CONTAINING ALCOHOL DO YOU HAVE ON A TYPICAL DAY: PATIENT DOES NOT DRINK

## 2024-12-02 ASSESSMENT — PATIENT HEALTH QUESTIONNAIRE - PHQ9
10. IF YOU CHECKED OFF ANY PROBLEMS, HOW DIFFICULT HAVE THESE PROBLEMS MADE IT FOR YOU TO DO YOUR WORK, TAKE CARE OF THINGS AT HOME, OR GET ALONG WITH OTHER PEOPLE: NOT DIFFICULT AT ALL
2. FEELING DOWN, DEPRESSED OR HOPELESS: SEVERAL DAYS
1. LITTLE INTEREST OR PLEASURE IN DOING THINGS: SEVERAL DAYS
SUM OF ALL RESPONSES TO PHQ9 QUESTIONS 1 & 2: 2

## 2024-12-02 ASSESSMENT — PAIN SCALES - GENERAL: PAINLEVEL_OUTOF10: 5

## 2024-12-02 NOTE — PROGRESS NOTES
Subjective     History of Present Illness:   Kandy Finn is a 64 y.o. female with GERD, HTN, RODRIGUEZ, COPD, migraine, tobacco use, obesity, and schizophrenia who presented to GI clinic for follow-up.  Patient reports having constipation and has to strain to have a bowel movement.  She takes 3 gummy fiber daily but has not helped.  Patient reports having abdominal bloating daily.  She has nausea that usually concurs with the onset of migraine headache.  She continues to take omeprazole 40 mg once daily.      IBD History:  Patient reports abdominal bloating and nausea without vomiting. She denies having blood in her stool or weight loss. She underwent EGD and colonoscopy on 8/5/15 which showed LA grade A esophagitis, 3 cm hiatal hernia, gastropathy, sigmoid diverticulosis, and colon polyps (hyperplastic). Patient has taken sapna-selzer and pepto-bismol which did not help. She takes omeprazole 40 mg once a day which helps her acid reflux.  Patient was recommended to fiber supplement.     EGD and colonoscopy on 9/13/24 showed 2 cm hiatal hernia, grade A esophagitis, gastropathy (portal hypertensive gastropathy), duodenitis, hyperplastic colon polyps, and diverticulosis.  Pathology showed nonspecific gastritis, no H. Pylori, and normal colon mucosa.      Review of Systems  Review of Systems   Constitutional:  Negative for chills, fever and unexpected weight change.   HENT:  Negative for mouth sores.    Eyes:  Negative for pain.   Respiratory:  Negative for shortness of breath.    Cardiovascular:  Negative for chest pain.   Gastrointestinal:         See HPI   Musculoskeletal:  Negative for arthralgias.   Skin:  Negative for rash.   Neurological:  Negative for weakness.       Past Medical History   has a past medical history of Asthma, Contact with and (suspected) exposure to infections with a predominantly sexual mode of transmission (04/03/2017), COPD (chronic obstructive pulmonary disease) (Multi), GERD (gastroesophageal  reflux disease), Hyperlipidemia, Hypertension, Lumbar radiculopathy, chronic (04/15/2024), Personal history of diseases of the skin and subcutaneous tissue (03/15/2016), Personal history of other diseases of the digestive system (01/16/2018), Personal history of other diseases of the nervous system and sense organs (07/07/2020), Personal history of other diseases of the respiratory system (03/06/2017), Personal history of other specified conditions (08/30/2018), Personal history of other specified conditions (03/23/2020), Sleep apnea, and Unspecified abdominal pain (10/12/2017).     Social History   reports that she has been smoking cigarettes. She has been exposed to tobacco smoke. She has never used smokeless tobacco. She reports that she does not currently use alcohol. She reports that she does not currently use drugs.     Family History  family history is not on file.     Allergies  Allergies   Allergen Reactions    Penicillins Anaphylaxis, Swelling, Unknown, Hives and Itching     rash.itching    Aspirin Hives, Itching and Swelling    Azithromycin Other    Lisinopril Angioedema and Swelling       Medications  Current Outpatient Medications   Medication Instructions    albuterol 2.5 mg /3 mL (0.083 %) nebulizer solution INHALE 1 VIAL VIA NEBULIZER EVERY 4 TO 6 HOURS AS NEEDED FOR WHEEZINGOR SHORTNESS OF BREATH    albuterol 90 mcg/actuation inhaler 2 puffs, inhalation, Every 4 hours PRN    amLODIPine (NORVASC) 5 mg, oral, Daily    benztropine (Cogentin) 2 mg tablet 1 tablet, oral, Nightly    budesonide-glycopyr-formoterol (BREZTRI) 160-9-4.8 mcg/actuation HFA aerosol inhaler 2 puffs, inhalation, 2 times daily RT    fluPHENAZine decanoate (PROLIXIN) 50 mg, intramuscular, Every 21 days    fluticasone propion-salmeteroL (AirDuo RespiClick) 113-14 mcg/actuation inhaler One inhalation daily    hydroCHLOROthiazide (HYDRODIURIL) 25 mg, oral, Daily    hydrOXYzine HCL (ATARAX) 25 mg, oral, 3 times daily    hydrOXYzine  pamoate (VISTARIL) 25 mg, oral, Daily PRN    losartan (COZAAR) 100 mg, oral, Daily RT    nicotine 10 mg/mL spray,non-aerosol 1 spray in each nostril: 1-2 times per hour (up to 5 times per hour, or 40 times per day)    omeprazole (PRILOSEC) 40 mg, oral, Daily before breakfast, Do not crush or chew.    oxybutynin (DITROPAN) 5 mg, oral, 2 times daily    potassium chloride CR (Klor-Con) 10 mEq ER tablet 10 mEq, oral, Daily, Do not crush, chew, or split.    simvastatin (ZOCOR) 20 mg, oral, Nightly    sodium chloride (Ayr) 0.65 % nasal drops 1 drop, nasal, 2 times daily PRN    triamcinolone (Kenalog) 0.025 % ointment 1 Application, Topical, 2 times daily PRN    varenicline (Chantix BERTA) 0.5 mg (11)- 1 mg (42) tablet Days 1 to 3: 0.5 milligram (mg) once a day. Days 4 to 7: 0.5 mg 2 times per day, one in the morning and one in the evening. Days 8 to end of treatment: 1 mg 2 times per day.    varenicline (Chantix) 1 mg tablet Take one 1 mg tablet in the morning and one 1 mg tablet in the evening.        Objective   Visit Vitals  BP (!) 145/97   Pulse 79   Temp 36.6 °C (97.8 °F)      Vitals:    12/02/24 1129   Weight: 123 kg (271 lb 14.4 oz)     Body mass index is 42.59 kg/m².  Physical Exam  Constitutional:       Appearance: Normal appearance.   HENT:      Head: Normocephalic and atraumatic.   Eyes:      General: No scleral icterus.  Cardiovascular:      Rate and Rhythm: Normal rate and regular rhythm.   Pulmonary:      Effort: Pulmonary effort is normal.      Breath sounds: Normal breath sounds. No wheezing.   Abdominal:      General: Bowel sounds are normal.      Palpations: Abdomen is soft. There is no mass.      Tenderness: There is abdominal tenderness. There is no guarding or rebound.      Comments: Mild tenderness to palpation in the periumbilical region   Musculoskeletal:         General: Normal range of motion.      Cervical back: Normal range of motion.   Skin:     Coloration: Skin is not jaundiced.   Neurological:  "     Mental Status: She is alert and oriented to person, place, and time.         Labs  Lab Results   Component Value Date    WBC 4.5 04/15/2024    HGB 14.1 04/15/2024    HCT 45.4 04/15/2024    MCV 94 04/15/2024     04/15/2024     Lab Results   Component Value Date    GLUCOSE 87 08/15/2024    CALCIUM 9.6 08/15/2024     08/15/2024    K 3.7 08/15/2024    CO2 34 (H) 08/15/2024    CL 98 08/15/2024    BUN 14 08/15/2024    CREATININE 0.83 08/15/2024     Lab Results   Component Value Date    ALT 9 08/15/2024    AST 11 08/15/2024    ALKPHOS 63 08/15/2024    BILITOT 0.8 08/15/2024     Lab Results   Component Value Date    INR 1.1 11/28/2017     No results found for: \"CALPS\"    Assessment   Kandy Finn is a 64 y.o. female with GERD, HTN, RODRIGUEZ, COPD, migraine, tobacco use, obesity, and schizophrenia who presented to GI clinic for follow-up. Patient reports having chronic constipation and mild abdominal discomfort and nausea.  Recent EGD and colonoscopy were unrevealing besides esophagitis.  Her symptoms may be due to dyspepsia or IBS with constipation.      Plan  Take miralax once daily to help with constipation.  Encourage weight loss.  Increase omeprazole to twice a day for a month, then decrease to once daily thereafter.  Follow-up 4 months.    Juan Rm MD         "

## 2024-12-02 NOTE — PATIENT INSTRUCTIONS
Take miralax once daily to help with constipation.  Encourage weight loss.  Increase omeprazole to twice a day for a month, then decrease to once daily thereafter.  Follow-up 4 months.

## 2024-12-03 ASSESSMENT — ENCOUNTER SYMPTOMS
SHORTNESS OF BREATH: 0
FEVER: 0
EYE PAIN: 0
ROS GI COMMENTS: SEE HPI
CHILLS: 0
WEAKNESS: 0
ARTHRALGIAS: 0
UNEXPECTED WEIGHT CHANGE: 0

## 2025-01-06 ENCOUNTER — APPOINTMENT (OUTPATIENT)
Dept: PRIMARY CARE | Facility: CLINIC | Age: 65
End: 2025-01-06
Payer: COMMERCIAL

## 2025-06-02 ENCOUNTER — APPOINTMENT (OUTPATIENT)
Dept: PRIMARY CARE | Facility: CLINIC | Age: 65
End: 2025-06-02
Payer: COMMERCIAL

## 2025-06-13 ENCOUNTER — HOSPITAL ENCOUNTER (EMERGENCY)
Facility: HOSPITAL | Age: 65
Discharge: HOME | End: 2025-06-13
Attending: EMERGENCY MEDICINE
Payer: MEDICARE

## 2025-06-13 ENCOUNTER — CLINICAL SUPPORT (OUTPATIENT)
Dept: EMERGENCY MEDICINE | Facility: HOSPITAL | Age: 65
End: 2025-06-13
Payer: MEDICARE

## 2025-06-13 ENCOUNTER — APPOINTMENT (OUTPATIENT)
Dept: RADIOLOGY | Facility: HOSPITAL | Age: 65
End: 2025-06-13
Payer: MEDICARE

## 2025-06-13 VITALS
SYSTOLIC BLOOD PRESSURE: 155 MMHG | BODY MASS INDEX: 42.56 KG/M2 | OXYGEN SATURATION: 93 % | TEMPERATURE: 97.4 F | DIASTOLIC BLOOD PRESSURE: 88 MMHG | HEIGHT: 67 IN | RESPIRATION RATE: 17 BRPM | HEART RATE: 79 BPM | WEIGHT: 271.17 LBS

## 2025-06-13 DIAGNOSIS — S82.002A CLOSED NONDISPLACED FRACTURE OF LEFT PATELLA, UNSPECIFIED FRACTURE MORPHOLOGY, INITIAL ENCOUNTER: ICD-10-CM
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
## 2025-06-13 LAB
ALBUMIN SERPL BCP-MCNC: 4.1 G/DL (ref 3.4–5)
ALP SERPL-CCNC: 61 U/L (ref 33–136)
ALT SERPL W P-5'-P-CCNC: 9 U/L (ref 7–45)
ANION GAP SERPL CALC-SCNC: 14 MMOL/L (ref 10–20)
APPEARANCE UR: ABNORMAL
AST SERPL W P-5'-P-CCNC: 13 U/L (ref 9–39)
BASOPHILS # BLD AUTO: 0.02 X10*3/UL (ref 0–0.1)
BASOPHILS NFR BLD AUTO: 0.3 %
BILIRUB SERPL-MCNC: 0.8 MG/DL (ref 0–1.2)
BILIRUB UR STRIP.AUTO-MCNC: NEGATIVE MG/DL
BNP SERPL-MCNC: 64 PG/ML (ref 0–99)
BUN SERPL-MCNC: 20 MG/DL (ref 6–23)
CALCIUM SERPL-MCNC: 9.5 MG/DL (ref 8.6–10.6)
CARDIAC TROPONIN I PNL SERPL HS: 11 NG/L (ref 0–34)
CHLORIDE SERPL-SCNC: 100 MMOL/L (ref 98–107)
CK SERPL-CCNC: 106 U/L (ref 0–215)
CO2 SERPL-SCNC: 30 MMOL/L (ref 21–32)
COLOR UR: YELLOW
CREAT SERPL-MCNC: 0.84 MG/DL (ref 0.5–1.05)
EGFRCR SERPLBLD CKD-EPI 2021: 77 ML/MIN/1.73M*2
EOSINOPHIL # BLD AUTO: 0.08 X10*3/UL (ref 0–0.7)
EOSINOPHIL NFR BLD AUTO: 1.4 %
ERYTHROCYTE [DISTWIDTH] IN BLOOD BY AUTOMATED COUNT: 14.2 % (ref 11.5–14.5)
GLUCOSE SERPL-MCNC: 97 MG/DL (ref 74–99)
GLUCOSE UR STRIP.AUTO-MCNC: NORMAL MG/DL
HCT VFR BLD AUTO: 44.9 % (ref 36–46)
HGB BLD-MCNC: 14.7 G/DL (ref 12–16)
IMM GRANULOCYTES # BLD AUTO: 0.02 X10*3/UL (ref 0–0.7)
IMM GRANULOCYTES NFR BLD AUTO: 0.3 % (ref 0–0.9)
KETONES UR STRIP.AUTO-MCNC: NEGATIVE MG/DL
LEUKOCYTE ESTERASE UR QL STRIP.AUTO: ABNORMAL
LYMPHOCYTES # BLD AUTO: 1.46 X10*3/UL (ref 1.2–4.8)
LYMPHOCYTES NFR BLD AUTO: 24.7 %
MCH RBC QN AUTO: 28.5 PG (ref 26–34)
MCHC RBC AUTO-ENTMCNC: 32.7 G/DL (ref 32–36)
MCV RBC AUTO: 87 FL (ref 80–100)
MONOCYTES # BLD AUTO: 0.67 X10*3/UL (ref 0.1–1)
MONOCYTES NFR BLD AUTO: 11.3 %
MUCOUS THREADS #/AREA URNS AUTO: ABNORMAL /LPF
NEUTROPHILS # BLD AUTO: 3.67 X10*3/UL (ref 1.2–7.7)
NEUTROPHILS NFR BLD AUTO: 62 %
NITRITE UR QL STRIP.AUTO: NEGATIVE
NRBC BLD-RTO: 0 /100 WBCS (ref 0–0)
PH UR STRIP.AUTO: 6 [PH]
PLATELET # BLD AUTO: 177 X10*3/UL (ref 150–450)
POTASSIUM SERPL-SCNC: 3.3 MMOL/L (ref 3.5–5.3)
PROT SERPL-MCNC: 7 G/DL (ref 6.4–8.2)
PROT UR STRIP.AUTO-MCNC: ABNORMAL MG/DL
RBC # BLD AUTO: 5.15 X10*6/UL (ref 4–5.2)
RBC # UR STRIP.AUTO: NEGATIVE MG/DL
RBC #/AREA URNS AUTO: ABNORMAL /HPF
SODIUM SERPL-SCNC: 141 MMOL/L (ref 136–145)
SP GR UR STRIP.AUTO: 1.02
SQUAMOUS #/AREA URNS AUTO: ABNORMAL /HPF
UROBILINOGEN UR STRIP.AUTO-MCNC: NORMAL MG/DL
WBC # BLD AUTO: 5.9 X10*3/UL (ref 4.4–11.3)
WBC #/AREA URNS AUTO: ABNORMAL /HPF

## 2025-06-13 PROCEDURE — 80053 COMPREHEN METABOLIC PANEL: CPT | Performed by: EMERGENCY MEDICINE

## 2025-06-13 PROCEDURE — 72125 CT NECK SPINE W/O DYE: CPT

## 2025-06-13 PROCEDURE — 70450 CT HEAD/BRAIN W/O DYE: CPT

## 2025-06-13 PROCEDURE — 73564 X-RAY EXAM KNEE 4 OR MORE: CPT | Mod: LT

## 2025-06-13 PROCEDURE — 81001 URINALYSIS AUTO W/SCOPE: CPT | Performed by: EMERGENCY MEDICINE

## 2025-06-13 PROCEDURE — 85025 COMPLETE CBC W/AUTO DIFF WBC: CPT | Performed by: EMERGENCY MEDICINE

## 2025-06-13 PROCEDURE — 71046 X-RAY EXAM CHEST 2 VIEWS: CPT

## 2025-06-13 PROCEDURE — 73564 X-RAY EXAM KNEE 4 OR MORE: CPT | Mod: RT

## 2025-06-13 PROCEDURE — 70450 CT HEAD/BRAIN W/O DYE: CPT | Performed by: RADIOLOGY

## 2025-06-13 PROCEDURE — 36415 COLL VENOUS BLD VENIPUNCTURE: CPT | Performed by: EMERGENCY MEDICINE

## 2025-06-13 PROCEDURE — 73610 X-RAY EXAM OF ANKLE: CPT | Mod: RIGHT SIDE | Performed by: STUDENT IN AN ORGANIZED HEALTH CARE EDUCATION/TRAINING PROGRAM

## 2025-06-13 PROCEDURE — 71046 X-RAY EXAM CHEST 2 VIEWS: CPT | Performed by: STUDENT IN AN ORGANIZED HEALTH CARE EDUCATION/TRAINING PROGRAM

## 2025-06-13 PROCEDURE — 73610 X-RAY EXAM OF ANKLE: CPT | Mod: LT

## 2025-06-13 PROCEDURE — 99284 EMERGENCY DEPT VISIT MOD MDM: CPT | Performed by: EMERGENCY MEDICINE

## 2025-06-13 PROCEDURE — 73610 X-RAY EXAM OF ANKLE: CPT | Mod: RT

## 2025-06-13 PROCEDURE — 73564 X-RAY EXAM KNEE 4 OR MORE: CPT | Mod: LEFT SIDE | Performed by: STUDENT IN AN ORGANIZED HEALTH CARE EDUCATION/TRAINING PROGRAM

## 2025-06-13 PROCEDURE — 2500000001 HC RX 250 WO HCPCS SELF ADMINISTERED DRUGS (ALT 637 FOR MEDICARE OP): Performed by: EMERGENCY MEDICINE

## 2025-06-13 PROCEDURE — 72125 CT NECK SPINE W/O DYE: CPT | Performed by: STUDENT IN AN ORGANIZED HEALTH CARE EDUCATION/TRAINING PROGRAM

## 2025-06-13 PROCEDURE — 93005 ELECTROCARDIOGRAM TRACING: CPT

## 2025-06-13 PROCEDURE — 84484 ASSAY OF TROPONIN QUANT: CPT | Performed by: EMERGENCY MEDICINE

## 2025-06-13 PROCEDURE — 82550 ASSAY OF CK (CPK): CPT | Performed by: EMERGENCY MEDICINE

## 2025-06-13 PROCEDURE — 93010 ELECTROCARDIOGRAM REPORT: CPT

## 2025-06-13 PROCEDURE — 99285 EMERGENCY DEPT VISIT HI MDM: CPT | Mod: 25 | Performed by: EMERGENCY MEDICINE

## 2025-06-13 PROCEDURE — 83880 ASSAY OF NATRIURETIC PEPTIDE: CPT | Performed by: EMERGENCY MEDICINE

## 2025-06-13 RX ORDER — ACETAMINOPHEN 650 MG/1
650 SUPPOSITORY RECTAL ONCE
Status: DISCONTINUED | OUTPATIENT
Start: 2025-06-13 | End: 2025-06-13

## 2025-06-13 RX ORDER — ACETAMINOPHEN 325 MG/1
650 TABLET ORAL ONCE
Status: COMPLETED | OUTPATIENT
Start: 2025-06-13 | End: 2025-06-13

## 2025-06-13 RX ORDER — ACETAMINOPHEN 500 MG
500 TABLET ORAL EVERY 6 HOURS PRN
Qty: 30 TABLET | Refills: 0 | Status: CANCELLED | OUTPATIENT
Start: 2025-06-13 | End: 2025-06-23

## 2025-06-13 RX ADMIN — ACETAMINOPHEN 650 MG: 325 TABLET ORAL at 12:55

## 2025-06-13 ASSESSMENT — PAIN SCALES - GENERAL
PAINLEVEL_OUTOF10: 1
PAINLEVEL_OUTOF10: 8
PAINLEVEL_OUTOF10: 10 - WORST POSSIBLE PAIN

## 2025-06-13 ASSESSMENT — LIFESTYLE VARIABLES
EVER FELT BAD OR GUILTY ABOUT YOUR DRINKING: NO
HAVE PEOPLE ANNOYED YOU BY CRITICIZING YOUR DRINKING: NO
TOTAL SCORE: 0
EVER HAD A DRINK FIRST THING IN THE MORNING TO STEADY YOUR NERVES TO GET RID OF A HANGOVER: NO
HAVE YOU EVER FELT YOU SHOULD CUT DOWN ON YOUR DRINKING: NO

## 2025-06-13 ASSESSMENT — PAIN - FUNCTIONAL ASSESSMENT: PAIN_FUNCTIONAL_ASSESSMENT: 0-10

## 2025-06-13 ASSESSMENT — ACTIVITIES OF DAILY LIVING (ADL): LACK_OF_TRANSPORTATION: NO

## 2025-06-13 NOTE — ED TRIAGE NOTES
Pt arrived for fall seven hors ago, pt slipped in bathroom. Bilateral knee and ankle pain, -LOC, -BT, and - headstrike. VSS, pt alert and oriented x4.

## 2025-06-13 NOTE — CONSULTS
Orthopaedic Surgery Consult Note    Subjective:  64F (HTN, CHF, COPD) p/a mGLF forward onto both of her knees. Closed. NVI. Able to SLR and extend her knee against resistance. Patient has hx of OA in the BL knees, has been ambulating with a walker in the ED.     Orthopaedic Problems/Injuries: L patella lateral facet fx vs osteophyte  Other Injuries: N/A    PMH: per above/EMR  PSH: per above/EMR  SocHx: per EMR  FamHx:  Non-contributory to this patient's acute orthopaedic problem other than as mentioned in HPI  All: Reviewed in EMR  Meds: Reviewed in EMR    Objective:  · Physical Exam:  - Constitutional: No acute distress, cooperative  - Eyes: EOM grossly intact  - Head/Neck: Trachea midline  - Respiratory/Thorax: Normal work of breathing  - Cardiovascular: RRR on peripheral palpation  - Gastrointestinal: Nondistended  - Psychological: Appropriate mood/behavior  - Skin: Warm and dry. Additional findings in musculoskeletal evaluation  - Musculoskeletal:  Left Lower Extremity:   -Skin intact  - Able to SLR and extend her knee against resistance.   - Mild swelling to the ankle. No significant tenderness to palpation.   -Fires DF/PF/EHL/FHL  -SILT in saph/sural/SPN/DPN distributions  -Foot warm, well perfused  -Palpable DP pulse, brisk cap refill  -Compartments soft and compressible      ROS      - 14 point ROS negative except as above    Results for orders placed or performed during the hospital encounter of 06/13/25 (from the past 24 hours)   CBC and Auto Differential   Result Value Ref Range    WBC 5.9 4.4 - 11.3 x10*3/uL    nRBC 0.0 0.0 - 0.0 /100 WBCs    RBC 5.15 4.00 - 5.20 x10*6/uL    Hemoglobin 14.7 12.0 - 16.0 g/dL    Hematocrit 44.9 36.0 - 46.0 %    MCV 87 80 - 100 fL    MCH 28.5 26.0 - 34.0 pg    MCHC 32.7 32.0 - 36.0 g/dL    RDW 14.2 11.5 - 14.5 %    Platelets 177 150 - 450 x10*3/uL    Neutrophils % 62.0 40.0 - 80.0 %    Immature Granulocytes %, Automated 0.3 0.0 - 0.9 %    Lymphocytes % 24.7 13.0 - 44.0 %     Monocytes % 11.3 2.0 - 10.0 %    Eosinophils % 1.4 0.0 - 6.0 %    Basophils % 0.3 0.0 - 2.0 %    Neutrophils Absolute 3.67 1.20 - 7.70 x10*3/uL    Immature Granulocytes Absolute, Automated 0.02 0.00 - 0.70 x10*3/uL    Lymphocytes Absolute 1.46 1.20 - 4.80 x10*3/uL    Monocytes Absolute 0.67 0.10 - 1.00 x10*3/uL    Eosinophils Absolute 0.08 0.00 - 0.70 x10*3/uL    Basophils Absolute 0.02 0.00 - 0.10 x10*3/uL   Creatine Kinase   Result Value Ref Range    Creatine Kinase 106 0 - 215 U/L   Comprehensive metabolic panel   Result Value Ref Range    Glucose 97 74 - 99 mg/dL    Sodium 141 136 - 145 mmol/L    Potassium 3.3 (L) 3.5 - 5.3 mmol/L    Chloride 100 98 - 107 mmol/L    Bicarbonate 30 21 - 32 mmol/L    Anion Gap 14 10 - 20 mmol/L    Urea Nitrogen 20 6 - 23 mg/dL    Creatinine 0.84 0.50 - 1.05 mg/dL    eGFR 77 >60 mL/min/1.73m*2    Calcium 9.5 8.6 - 10.6 mg/dL    Albumin 4.1 3.4 - 5.0 g/dL    Alkaline Phosphatase 61 33 - 136 U/L    Total Protein 7.0 6.4 - 8.2 g/dL    AST 13 9 - 39 U/L    Bilirubin, Total 0.8 0.0 - 1.2 mg/dL    ALT 9 7 - 45 U/L   B-type natriuretic peptide   Result Value Ref Range    BNP 64 0 - 99 pg/mL   Troponin I, High Sensitivity   Result Value Ref Range    Troponin I, High Sensitivity (CMC) 11 0 - 34 ng/L   Urinalysis with Reflex Microscopic   Result Value Ref Range    Color, Urine Yellow Light-Yellow, Yellow, Dark-Yellow    Appearance, Urine Turbid (N) Clear    Specific Gravity, Urine 1.025 1.005 - 1.035    pH, Urine 6.0 5.0, 5.5, 6.0, 6.5, 7.0, 7.5, 8.0    Protein, Urine 10 (TRACE) NEGATIVE, 10 (TRACE), 20 (TRACE) mg/dL    Glucose, Urine Normal Normal mg/dL    Blood, Urine NEGATIVE NEGATIVE mg/dL    Ketones, Urine NEGATIVE NEGATIVE mg/dL    Bilirubin, Urine NEGATIVE NEGATIVE mg/dL    Urobilinogen, Urine Normal Normal mg/dL    Nitrite, Urine NEGATIVE NEGATIVE    Leukocyte Esterase, Urine 250 Isabella/uL (A) NEGATIVE   Microscopic Only, Urine   Result Value Ref Range    WBC, Urine 6-10 (A) 1-5, NONE  /HPF    RBC, Urine 6-10 (A) NONE, 1-2, 3-5 /HPF    Squamous Epithelial Cells, Urine 26-50 (1+) Reference range not established. /HPF    Mucus, Urine FEW Reference range not established. /LPF       CT head wo IV contrast   Final Result   1. Negative head CT for acute change.   2. There is a background of age-related volume loss, atherosclerotic   disease, and ischemic white matter demyelination.        MACRO:   None        Signed by: Neri Granados 6/13/2025 2:01 PM   Dictation workstation:   [id number]      CT cervical spine wo IV contrast   Final Result   1. No evidence for an acute fracture or subluxation of the cervical   spine.        2. At C4-C5 and C5-C6: Moderate bilateral neural foramina narrowing   and mild-to-moderate spinal canal stenosis.        MACRO:   None        Signed by: Franki Triplett 6/13/2025 2:12 PM   Dictation workstation:   [id number]      XR knee right 4+ views   Final Result   Questionable lateral patellar pole fracture on the left. Correlate   with point tenderness.        No acute fracture or dislocation of the right knee.        MACRO:   None        Signed by: Rigoberto Thakur 6/13/2025 12:34 PM   Dictation workstation:   [id number]      XR knee left 4+ views   Final Result   Questionable lateral patellar pole fracture on the left. Correlate   with point tenderness.        No acute fracture or dislocation of the right knee.        MACRO:   None        Signed by: Rigoberto Thakur 6/13/2025 12:34 PM   Dictation workstation:   [id number]      XR ankle right 3+ views   Final Result   Questionable left medial malleolar avulsion.        No acute fracture or dislocation of the right ankle.             MACRO:   None        Signed by: Rigoberto Thakur 6/13/2025 12:35 PM   Dictation workstation:   [id number]      XR ankle left 3+ views   Final Result   Questionable left medial malleolar avulsion.        No acute fracture or dislocation of the right ankle.             MACRO:   None        Signed by:  Rigobertomariana Maciasumu 6/13/2025 12:35 PM   Dictation workstation:   [id number]      XR chest 2 views   Final Result   1.  Mild cardiomediastinal enlargement with interstitial prominence.   Correlate with volume status and concern for interstitial edema.   2. No acute fractures are evident.                  MACRO:   None        Signed by: Rigoberto Ofe 6/13/2025 12:36 PM   Dictation workstation:   [id number]          Imaging: XR w L patella lateral facet fx vs osteophyte.    Assessment/Plan:  64F (HTN, CHF, COPD) p/a mGLF forward onto both of her knees. Closed. NVI. Able to SLR and extend her knee against resistance. Patient has hx of OA in the BL knees, has been ambulating with a walker in the ED. XR w L patella lateral facet fx vs osteophyte.     Recommendations:  - No acute orthopaedic surgical intervention indicated at this time.  - WB status: WBAT BL LE  - Antibiotics:  No indication for ABx from an orthopedic perspective  - Pain management per ED primary team  - Orthopedics is signing off    - Okay to discharge from ED from an orthopedic perspective   - Please page with any questions/concerns.    Patient should follow-up with Dr. David as needed after discharge. Appointments can be made by calling [phone].     This consult was seen and staffed with attending surgeon, Dr. David.     Luis Agosto MD  Orthopaedic Surgery PGY1  [facility]  [employer]

## 2025-06-13 NOTE — PROGRESS NOTES
Kandy Finn is a 64 y.o. female on day 0 of admission        06/13/25 1443   Discharge Planning   Living Arrangements Alone   Support Systems Family members   Assistance Needed None   Type of Residence Private residence   Number of Stairs to Enter Residence 2   Number of Stairs Within Residence 16  (1 flight (pt uncertain of number of stairs))   Do you have animals or pets at home? No   Who is requesting discharge planning? Provider   Home or Post Acute Services None   Expected Discharge Disposition Home H   Does the patient need discharge transport arranged? No   Financial Resource Strain   How hard is it for you to pay for the very basics like food, housing, medical care, and heating? Not very   Housing Stability   In the last 12 months, was there a time when you were not able to pay the mortgage or rent on time? N   In the past 12 months, how many times have you moved where you were living? 0   At any time in the past 12 months, were you homeless or living in a shelter (including now)? N   Transportation Needs   In the past 12 months, has lack of transportation kept you from medical appointments or from getting medications? no   In the past 12 months, has lack of transportation kept you from meetings, work, or from getting things needed for daily living? No   Patient Choice   Provider Choice list and CMS website (https://medicare.gov/care-compare#search) for post-acute Quality and Resource Measure Data were provided and reviewed with: Patient   Stroke Family Assessment   Stroke Family Assessment Needed No   Intensity of Service   Intensity of Service 0-30 min     TCC introduced self and role in care to patient. Pt lives alone. Pt reports she is able to do for self and usually drives. She lives in [place]. She is upstairs and her sister Nivia lives downstairs. Pt has a cane and walker. Pt refusing option of Skilled Nursing Facility.  Pt reports she wants to dc to home with Home Health (Requesting [facility]; she has  used them before). Pt reports she can stay downstairs with her sister until she is able to go up the stairs. Pt reports support from her sisters as well as her daughter who comes over every other day to help her. Pt reports she is on disability. Pt has  Providers. She is to start with a new one @ [facility] soon (since it is closer to her house). Pt has Medicare A&B insurance.    [facility] notified Provider regarding patient's DC plan preference. [facility] will continue to follow for dc planning.    [facility] notified of referral via secure chat.    [facility] accepted patient (acceptance notification through secure chat. Pt reports her sister will come pick her up at discharge.    Angelic Ac [facility]

## 2025-06-13 NOTE — ED PROVIDER NOTES
HPI   Chief Complaint   Patient presents with   • Fall       Ms. Finn is a 63 y/o F with a history of HTN, CHF, COPD, Osteoarthritis of b/l knee, b/l slipped knee caps, sciatica. She fell this morning at 3:15 and hit her left knee and ankle on the ground, back of hear head and right upper arm. She states that she was incontinent and slipped on her urine in the bathroom. She was on the ground for 45 minutes before her sister who lives in the apartment below her came to get her. She has had severe pain in both knees and both ankles since the fall that she describes as cramping and rates as an 8/10. She has no headache although she does get migraines often. She has had no dizziness, no SOB, no chest pain, no palpitations, no nausea/vomiting. She smokes a pack of cigarettes a day.                 Patient History   Medical History[1]  Surgical History[2]  Family History[3]  Social History[4]    Physical Exam   ED Triage Vitals [06/13/25 1045]   Temperature Heart Rate Respirations BP   36.6 °C (97.9 °F) 79 18 (!) 179/94      Pulse Ox Temp src Heart Rate Source Patient Position   (!) 91 % -- -- --      BP Location FiO2 (%)     -- --       Physical Exam  Constitutional:       Appearance: She is obese.      Comments: Appearing in pain   HENT:      Head: Atraumatic.   Eyes:      Extraocular Movements: Extraocular movements intact.      Pupils: Pupils are equal, round, and reactive to light.      Comments: Bluish sclera   Cardiovascular:      Rate and Rhythm: Normal rate and regular rhythm.      Pulses: Normal pulses.      Heart sounds: Normal heart sounds. No murmur heard.     No friction rub. No gallop.   Pulmonary:      Effort: Pulmonary effort is normal. No accessory muscle usage or respiratory distress.      Breath sounds: Rhonchi present.   Abdominal:      Tenderness: There is no abdominal tenderness.   Musculoskeletal:      Cervical back: Normal range of motion and neck supple. No signs of trauma. No pain with movement.       Right lower leg: No edema.      Left lower leg: No edema.      Comments: Left foot: Bone spur on dorsum of foot.   Left ankle: Edematous. No pain on passive range of motion. Pain on active range of motion. No ligamentous laxity. Mild tenderness to palpation of medial malleolus, no tenderness to palpation of lateral malleolus  Right ankle: Edematous. No pain on passive range of motion. Pain on active range of motion. No ligamentous laxity. No pain on palpation of lateral or medial malleolus  Left/right knee: Edematous, Pain on palpation, pain on active range of motion. Minimal pain on passive range of motion. Bruising over left knee. No ligamentous laxity.  Left/Right hip: Atraumatic, no pain on passive or active ROM  Back: Tenderness to palpation of lumbar spine. No cervical spinal tenderness.   Upper extremities: Bruising on right upper arm. Muscle strength 5/5 in upper extremity. No pain on palpation of wrist b/l.    Neurological:      Mental Status: She is alert.           ED Course & MDM                    No data recorded     [place] Coma Scale Score: 15 (06/13/25 1126 : Lazara Zendejas RN)                           Medical Decision Making  Ms. Finn is a 65 y/o F with a history of b/l OA, b/l patellar dislocations, COPD, HTN, CHF presenting today after a fall this morning. History was reassuring for a mechanical fall, patient stating that she slipped on her urine. EKG further ruled out cardiac causes of fall. O2 sat was low on admission, however this was expected with history of COPD. CXR showed possible mild fluid overload, however BNP and troponin showed no abnormalities, so concern was low. Patient also denied dizziness, fatigue. Patient complained of knee pain, ankle pain, and states that she hit her head. CT of cervical spine and head ruled out acute brain bleed and cervical fracture. CBC and CMP ruled out electrolyte abnormalities and anemia. CK ordered as patient was on the ground for a period after  falling.    Knee XR showed possible left lateral patellar pole fracture. Orthopedic asked to evaluate patient in ER. XR also showed possible medial malleolar avulsion on left ankle, but low concern correlating with physical exam.     Patient signed out to overnight team. Patient is able to ambulate with walker she received in ED and states she could stay with her sister in the apartment downstairs if she needs to and can't go up stairs. She would like to go home. Ortho states they are unconcerned about the knee and would likely recommend a knee immobilizer at most with outpatient follow up. Plan to see if patient can ambulate with knee immobilizer and discharge with shared decision making with patient      Amount and/or Complexity of Data Reviewed  External Data Reviewed: radiology.  Labs: ordered. Decision-making details documented in ED Course.  Radiology: ordered. Decision-making details documented in ED Course.  ECG/medicine tests: ordered. Decision-making details documented in ED Course.        Procedure  Procedures         [1]  Past Medical History:  Diagnosis Date   • Asthma    • Contact with and (suspected) exposure to infections with a predominantly sexual mode of transmission 04/03/2017    Exposure to STD   • COPD (chronic obstructive pulmonary disease) (Multi)    • GERD (gastroesophageal reflux disease)    • Hyperlipidemia    • Hypertension    • Lumbar radiculopathy, chronic 04/15/2024   • Personal history of diseases of the skin and subcutaneous tissue 03/15/2016    History of folliculitis   • Personal history of other diseases of the digestive system 01/16/2018    History of constipation   • Personal history of other diseases of the nervous system and sense organs 07/07/2020    History of conjunctivitis   • Personal history of other diseases of the respiratory system 03/06/2017    History of acute bronchitis   • Personal history of other specified conditions 08/30/2018    History of diarrhea   •  Personal history of other specified conditions 03/23/2020    History of abdominal pain   • Sleep apnea    • Unspecified abdominal pain 10/12/2017    Abdominal wall pain   [2]  Past Surgical History:  Procedure Laterality Date   • OTHER SURGICAL HISTORY  10/23/2017    Enterectomy   [3]  No family history on file.  [4]  Social History  Tobacco Use   • Smoking status: Every Day     Types: Cigarettes     Passive exposure: Past   • Smokeless tobacco: Never   Vaping Use   • Vaping status: Never Used   Substance Use Topics   • Alcohol use: Not Currently   • Drug use: Not Currently      Ernestina Wu  06/13/25 1531

## 2025-06-13 NOTE — PROGRESS NOTES
Emergency Department Transition of Care Note       Signout   I received Kandy Finn in signout from Dr. Rowley.  Please see the ED Provider Note for all HPI, PE and MDM up to the time of signout at 1500.  This is in addition to the primary record.    In brief Kandy Finn is an 64 y.o. female presenting with history of HTN, CHF, COPD, Osteoarthritis of b/l knee, b/l slipped knee caps, sciatica. She fell this morning at 3:15 and hit her left knee and ankle on the ground, back of hear head and right upper arm. She states that she was incontinent and slipped on her urine in the bathroom. She was on the ground for 45 minutes before her sister who lives in the apartment below her came to get her. She has had severe pain in both knees and both ankles since the fall that she describes as cramping and rates as an 8/10.     At the time of signout we were awaiting:  Evaluation and recommendations from orthopedics    ED Course & Medical Decision Making   Medical Decision Making:  Under my care, patient was seen and evaluated by orthopedics, who felt that she did not need an knee immobilizer, noted she can be weightbearing as tolerated, and from their perspective is safe for discharge with follow-up outpatient.  On my examination of patient, pain is well-controlled, patient is ambulating appropriately in the ED with the use of a walker.  Plan to discharge patient home with PT OT referral and outpatient orthopedics.  Patient understands and agrees with plan.    ED Course:  Diagnoses as of 06/13/25 1617   Fall, initial encounter   Closed nondisplaced fracture of left patella, unspecified fracture morphology, initial encounter       Disposition   As a result of the work-up, the patient was discharged home.  she was informed of her diagnosis and instructed to come back with any concerns or worsening of condition.  she and was agreeable to the plan as discussed above.  she was given the opportunity to ask questions.  All of the  patient's questions were answered.    Procedures   Procedures    Patient seen and discussed with ED attending physician.    Louis Simpson, DO  Emergency Medicine

## 2025-06-14 ENCOUNTER — DOCUMENTATION (OUTPATIENT)
Dept: HOME HEALTH SERVICES | Facility: HOME HEALTH | Age: 65
End: 2025-06-14
Payer: COMMERCIAL

## 2025-06-14 ENCOUNTER — HOME HEALTH ADMISSION (OUTPATIENT)
Dept: HOME HEALTH SERVICES | Facility: HOME HEALTH | Age: 65
End: 2025-06-14
Payer: COMMERCIAL

## 2025-06-14 NOTE — HH CARE COORDINATION
Home Care received a Referral for Physical Therapy, Occupational Therapy, and Home Health Aide. We have processed the referral for a Start of Care on 6.15 or 6.16.25.     If you have any questions or concerns, please feel free to contact us at [phone]. Follow the prompts, enter your five digit zip code, and you will be directed to your care team on [facility].

## 2025-06-15 LAB
ATRIAL RATE: 82 BPM
P AXIS: 86 DEGREES
P OFFSET: 171 MS
P ONSET: 115 MS
PR INTERVAL: 188 MS
Q ONSET: 209 MS
QRS COUNT: 14 BEATS
QRS DURATION: 98 MS
QT INTERVAL: 420 MS
QTC CALCULATION(BAZETT): 490 MS
QTC FREDERICIA: 466 MS
R AXIS: -22 DEGREES
T AXIS: 5 DEGREES
T OFFSET: 419 MS
VENTRICULAR RATE: 82 BPM

## 2025-06-17 ENCOUNTER — HOME CARE VISIT (OUTPATIENT)
Dept: HOME HEALTH SERVICES | Facility: HOME HEALTH | Age: 65
End: 2025-06-17
Payer: COMMERCIAL

## 2025-06-17 PROCEDURE — 169592 NO-PAY CLAIM PROCEDURE

## 2025-06-17 PROCEDURE — G0151 HHCP-SERV OF PT,EA 15 MIN: HCPCS | Mod: HHH

## 2025-06-17 SDOH — ECONOMIC STABILITY: HOUSING INSECURITY
HOME SAFETY: THERAPIST DISCUSSED WITH PATIENT AND CAREGIVER IMPORTANCE OF HOME SAFETY, SPECIFICALLY FOR CLUTTER FREE WALKING PATHWAYS AND ADEQUATE LIGHTING

## 2025-06-17 SDOH — HEALTH STABILITY: PHYSICAL HEALTH: EXERCISE ACTIVITIES SETS: 1

## 2025-06-17 SDOH — HEALTH STABILITY: PHYSICAL HEALTH: EXERCISE COMMENTS: SUPINE- ANKLE PUMPS, QUAD SETS, HIP FLEXION, SAQS, AND SLR

## 2025-06-17 SDOH — HEALTH STABILITY: PHYSICAL HEALTH: PHYSICAL EXERCISE: 10

## 2025-06-17 SDOH — HEALTH STABILITY: PHYSICAL HEALTH: PHYSICAL EXERCISE: SUPINE

## 2025-06-17 SDOH — HEALTH STABILITY: PHYSICAL HEALTH: EXERCISE ACTIVITY: SUPINE HEP

## 2025-06-17 SDOH — ECONOMIC STABILITY: HOUSING INSECURITY: OPEN FLAME PRESENT: 1

## 2025-06-17 SDOH — HEALTH STABILITY: PHYSICAL HEALTH: EXERCISE TYPE: SUPINE HEP

## 2025-06-17 ASSESSMENT — ACTIVITIES OF DAILY LIVING (ADL)
AMBULATION ASSISTANCE: 1
CURRENT_FUNCTION: INDEPENDENT
AMBULATION ASSISTANCE: STAND BY ASSIST
AMBULATION ASSISTANCE ON FLAT SURFACES: 1
ENTERING_EXITING_HOME: SUPERVISION
PHYSICAL TRANSFERS ASSESSED: 1
OASIS_M1830: 04
AMBULATION ASSISTANCE: ONE PERSON
AMBULATION_DISTANCE/DURATION_TOLERATED: 50 FEET WITH SUPERVISION AND NO DEVICE

## 2025-06-17 ASSESSMENT — ENCOUNTER SYMPTOMS
MUSCLE WEAKNESS: 1
PAIN LOCATION: LEFT KNEE
PAIN LOCATION - PAIN SEVERITY: 5/10
PAIN LOCATION - EXACERBATING FACTORS: PROLONGED STANDING, ROM
SUBJECTIVE PAIN PROGRESSION: WAXING AND WANING
LOWEST PAIN SEVERITY IN PAST 24 HOURS: 5/10
HYPERTENSION: 1
PAIN LOCATION - RELIEVING FACTORS: TYLENOL, ICE
HIGHEST PAIN SEVERITY IN PAST 24 HOURS: 8/10
FATIGUES EASILY: 1
PAIN: 1
PERSON REPORTING PAIN: PATIENT
LOSS OF SENSATION IN FEET: 0
OCCASIONAL FEELINGS OF UNSTEADINESS: 1
SHORTNESS OF BREATH: T
PAIN LOCATION - PAIN FREQUENCY: FREQUENT
PAIN SEVERITY GOAL: 3/10
DYSPNEA ON EXERTION: 1
LIMITED RANGE OF MOTION: 1
DEPRESSION: 1
ARTHRALGIAS: 1

## 2025-06-19 ENCOUNTER — HOME CARE VISIT (OUTPATIENT)
Dept: HOME HEALTH SERVICES | Facility: HOME HEALTH | Age: 65
End: 2025-06-19
Payer: COMMERCIAL

## 2025-06-19 VITALS
HEART RATE: 72 BPM | DIASTOLIC BLOOD PRESSURE: 84 MMHG | OXYGEN SATURATION: 97 % | SYSTOLIC BLOOD PRESSURE: 138 MMHG | TEMPERATURE: 97.4 F

## 2025-06-19 PROCEDURE — G0152 HHCP-SERV OF OT,EA 15 MIN: HCPCS | Mod: HHH

## 2025-06-19 ASSESSMENT — ENCOUNTER SYMPTOMS
PERSON REPORTING PAIN: PATIENT
HIGHEST PAIN SEVERITY IN PAST 24 HOURS: 8/10
PAIN LOCATION: LEFT KNEE
SHORTNESS OF BREATH: 1
DENIES PAIN: 1
DYSPNEA ACTIVITY LEVEL: AFTER AMBULATING 10 - 20 FT
OCCASIONAL FEELINGS OF UNSTEADINESS: 1
PAIN LOCATION - PAIN SEVERITY: 0/10
LOWEST PAIN SEVERITY IN PAST 24 HOURS: 0/10
PAIN LOCATION: RIGHT KNEE
PAIN LOCATION - PAIN SEVERITY: 0/10

## 2025-06-19 ASSESSMENT — ACTIVITIES OF DAILY LIVING (ADL)
DRESSING_LB_CURRENT_FUNCTION: INDEPENDENT
AMBULATION ASSISTANCE: SUPERVISION
AMBULATION ASSISTANCE: 1
BATHING_CURRENT_FUNCTION: SUPERVISION
TOILETING: 1
BATHING ASSESSED: 1
TOILETING: INDEPENDENT
DRESSING_UB_CURRENT_FUNCTION: INDEPENDENT

## 2025-06-23 ENCOUNTER — HOME CARE VISIT (OUTPATIENT)
Dept: HOME HEALTH SERVICES | Facility: HOME HEALTH | Age: 65
End: 2025-06-23
Payer: COMMERCIAL

## 2025-06-23 PROCEDURE — G0151 HHCP-SERV OF PT,EA 15 MIN: HCPCS | Mod: HHH

## 2025-06-23 SDOH — HEALTH STABILITY: PHYSICAL HEALTH: EXERCISE ACTIVITY: KNEE

## 2025-06-23 SDOH — ECONOMIC STABILITY: HOUSING INSECURITY: OPEN FLAME PRESENT: 1

## 2025-06-23 SDOH — HEALTH STABILITY: PHYSICAL HEALTH: EXERCISE ACTIVITY: ANKLE

## 2025-06-23 SDOH — HEALTH STABILITY: PHYSICAL HEALTH: EXERCISE ACTIVITIES SETS: 1

## 2025-06-23 SDOH — HEALTH STABILITY: PHYSICAL HEALTH
EXERCISE COMMENTS: TKA PROTOCOL INCLUDES THE FOLLOWING:  SUPINE- QUAD SETS, HEEL SLIDES, SLR, AND SAQS  SEATED- LAQS  STANDING- CALF RAISES, HAMSTRING CURLS, MARCHING, HIP EXT, HIP ABD, MINI SQUATS   UPDATED HEP, PAPER COPY PROVIDED. VERBAL CUES PROVIDED FOR TECHNIQUE

## 2025-06-23 SDOH — HEALTH STABILITY: PHYSICAL HEALTH

## 2025-06-23 SDOH — HEALTH STABILITY: PHYSICAL HEALTH
EXERCISE COMMENTS: ON ALL EXERCISES. EDUCATED PT ON RANGE OF PERFORMING EXERCISES TO AVOID PAIN FROM GOING TO END RANGES. PT DEMONSTRATES FAIR UNDERSTANDING OF HEP. ADVISED TO SPLIT ACTIVITIES UP THROUGHOUT THE DAY FOR INCREASED COMPLIANCE. PT AGREEABLE.

## 2025-06-23 SDOH — HEALTH STABILITY: PHYSICAL HEALTH: PHYSICAL EXERCISE: 10

## 2025-06-23 SDOH — HEALTH STABILITY: PHYSICAL HEALTH: EXERCISE TYPE: HIP/KNEE EXERCISES

## 2025-06-23 SDOH — HEALTH STABILITY: PHYSICAL HEALTH: EXERCISE ACTIVITY: HIP

## 2025-06-23 ASSESSMENT — ENCOUNTER SYMPTOMS
MUSCLE WEAKNESS: 1
PAIN LOCATION - PAIN SEVERITY: 3/10
PAIN: 1
PAIN LOCATION: RIGHT KNEE
PERSON REPORTING PAIN: PATIENT
LOSS OF SENSATION IN FEET: 0
OCCASIONAL FEELINGS OF UNSTEADINESS: 1
DEPRESSION: 0

## 2025-06-23 ASSESSMENT — ACTIVITIES OF DAILY LIVING (ADL)
AMBULATION ASSISTANCE: INDEPENDENT
AMBULATION ASSISTANCE: 1
CURRENT_FUNCTION: INDEPENDENT
PHYSICAL TRANSFERS ASSESSED: 1
AMBULATION ASSISTANCE ON FLAT SURFACES: 1
AMBULATION_DISTANCE/DURATION_TOLERATED: 30 FT X2

## 2025-06-25 ENCOUNTER — HOME CARE VISIT (OUTPATIENT)
Dept: HOME HEALTH SERVICES | Facility: HOME HEALTH | Age: 65
End: 2025-06-25
Payer: COMMERCIAL

## 2025-06-25 VITALS
DIASTOLIC BLOOD PRESSURE: 98 MMHG | TEMPERATURE: 98.1 F | OXYGEN SATURATION: 97 % | HEART RATE: 83 BPM | SYSTOLIC BLOOD PRESSURE: 146 MMHG

## 2025-06-25 PROCEDURE — G0152 HHCP-SERV OF OT,EA 15 MIN: HCPCS | Mod: HHH

## 2025-06-25 ASSESSMENT — ENCOUNTER SYMPTOMS
DENIES PAIN: 1
PERSON REPORTING PAIN: PATIENT

## 2025-06-27 ENCOUNTER — HOME CARE VISIT (OUTPATIENT)
Dept: HOME HEALTH SERVICES | Facility: HOME HEALTH | Age: 65
End: 2025-06-27
Payer: COMMERCIAL

## 2025-06-27 ENCOUNTER — TELEPHONE (OUTPATIENT)
Dept: ORTHOPEDIC SURGERY | Facility: HOSPITAL | Age: 65
End: 2025-06-27
Payer: COMMERCIAL

## 2025-06-27 NOTE — TELEPHONE ENCOUNTER
Upon reviewing Dr. Allen's schedule for 07/01 patient is scheduled for ED FUV.     Patient was consulted under Dr. David while he was on call. Sent [employer] message notifying patient appt will be cancelled and also left a detailed message on patient's voicemail.     Kiana Richey LPN

## 2025-06-30 ENCOUNTER — HOME CARE VISIT (OUTPATIENT)
Dept: HOME HEALTH SERVICES | Facility: HOME HEALTH | Age: 65
End: 2025-06-30
Payer: COMMERCIAL

## 2025-06-30 VITALS — DIASTOLIC BLOOD PRESSURE: 74 MMHG | SYSTOLIC BLOOD PRESSURE: 140 MMHG

## 2025-06-30 VITALS
SYSTOLIC BLOOD PRESSURE: 142 MMHG | HEART RATE: 77 BPM | TEMPERATURE: 98 F | DIASTOLIC BLOOD PRESSURE: 94 MMHG | OXYGEN SATURATION: 96 %

## 2025-06-30 PROCEDURE — G0152 HHCP-SERV OF OT,EA 15 MIN: HCPCS | Mod: HHH

## 2025-06-30 PROCEDURE — G0151 HHCP-SERV OF PT,EA 15 MIN: HCPCS | Mod: HHH

## 2025-06-30 SDOH — ECONOMIC STABILITY: HOUSING INSECURITY: OPEN FLAME PRESENT: 1

## 2025-06-30 SDOH — HEALTH STABILITY: PHYSICAL HEALTH: EXERCISE TYPE: SEATED HEP

## 2025-06-30 SDOH — HEALTH STABILITY: PHYSICAL HEALTH: EXERCISE ACTIVITY: SEATED HEP

## 2025-06-30 SDOH — HEALTH STABILITY: PHYSICAL HEALTH: EXERCISE COMMENTS: SEATED HEP INCLUDES ANKLE PUMPS, LAQS, HIP FLEXION, HEELSLIDES, AND HIP ABDUCTION

## 2025-06-30 SDOH — HEALTH STABILITY: PHYSICAL HEALTH

## 2025-06-30 SDOH — HEALTH STABILITY: PHYSICAL HEALTH: PHYSICAL EXERCISE: 10

## 2025-06-30 SDOH — HEALTH STABILITY: PHYSICAL HEALTH: EXERCISE ACTIVITIES SETS: 3

## 2025-06-30 ASSESSMENT — ENCOUNTER SYMPTOMS
MUSCLE WEAKNESS: 1
PAIN LOCATION - PAIN SEVERITY: 7/10
PAIN LOCATION - PAIN FREQUENCY: CONSTANT
OCCASIONAL FEELINGS OF UNSTEADINESS: 1
PAIN: 1
HIGHEST PAIN SEVERITY IN PAST 24 HOURS: 7/10
PERSON REPORTING PAIN: PATIENT
LIMITED RANGE OF MOTION: 1
SUBJECTIVE PAIN PROGRESSION: WAXING AND WANING
LOWEST PAIN SEVERITY IN PAST 24 HOURS: 0/10
PAIN LOCATION - PAIN FREQUENCY: FREQUENT
PAIN LOCATION: RIGHT KNEE
LOWEST PAIN SEVERITY IN PAST 24 HOURS: 4/10
PAIN LOCATION: RIGHT HIP
DEPRESSION: 0
PAIN: 1
PAIN SEVERITY GOAL: 3/10
ARTHRALGIAS: 1
HIGHEST PAIN SEVERITY IN PAST 24 HOURS: 3/10
PAIN LOCATION - PAIN SEVERITY: 3/10
PAIN LOCATION - PAIN QUALITY: ACHY
LOSS OF SENSATION IN FEET: 0
PAIN LOCATION - RELIEVING FACTORS: MEDS, HEAT
PERSON REPORTING PAIN: PATIENT

## 2025-06-30 ASSESSMENT — ACTIVITIES OF DAILY LIVING (ADL)
CURRENT_FUNCTION: INDEPENDENT
AMBULATION ASSISTANCE: 1
PHYSICAL TRANSFERS ASSESSED: 1
AMBULATION ASSISTANCE: ONE PERSON
AMBULATION ASSISTANCE ON FLAT SURFACES: 1
AMBULATION ASSISTANCE: STAND BY ASSIST

## 2025-07-01 ENCOUNTER — APPOINTMENT (OUTPATIENT)
Dept: ORTHOPEDIC SURGERY | Facility: HOSPITAL | Age: 65
End: 2025-07-01
Payer: COMMERCIAL

## 2025-07-07 ENCOUNTER — OFFICE VISIT (OUTPATIENT)
Facility: HOSPITAL | Age: 65
End: 2025-07-07
Payer: COMMERCIAL

## 2025-07-07 VITALS
DIASTOLIC BLOOD PRESSURE: 85 MMHG | HEIGHT: 67 IN | BODY MASS INDEX: 43.32 KG/M2 | SYSTOLIC BLOOD PRESSURE: 130 MMHG | HEART RATE: 88 BPM | WEIGHT: 276 LBS | OXYGEN SATURATION: 93 % | TEMPERATURE: 97 F

## 2025-07-07 DIAGNOSIS — R73.03 PREDIABETES: Primary | ICD-10-CM

## 2025-07-07 LAB — POC HEMOGLOBIN A1C: 5.9 % (ref 4.2–6.5)

## 2025-07-07 PROCEDURE — 1126F AMNT PAIN NOTED NONE PRSNT: CPT | Performed by: FAMILY MEDICINE

## 2025-07-07 PROCEDURE — 3075F SYST BP GE 130 - 139MM HG: CPT | Performed by: FAMILY MEDICINE

## 2025-07-07 PROCEDURE — 3079F DIAST BP 80-89 MM HG: CPT | Performed by: FAMILY MEDICINE

## 2025-07-07 PROCEDURE — G2211 COMPLEX E/M VISIT ADD ON: HCPCS | Performed by: FAMILY MEDICINE

## 2025-07-07 PROCEDURE — 1159F MED LIST DOCD IN RCRD: CPT | Performed by: FAMILY MEDICINE

## 2025-07-07 PROCEDURE — 3008F BODY MASS INDEX DOCD: CPT | Performed by: FAMILY MEDICINE

## 2025-07-07 PROCEDURE — 83036 HEMOGLOBIN GLYCOSYLATED A1C: CPT | Performed by: FAMILY MEDICINE

## 2025-07-07 PROCEDURE — 99214 OFFICE O/P EST MOD 30 MIN: CPT | Performed by: FAMILY MEDICINE

## 2025-07-07 RX ORDER — FLUPHENAZINE HYDROCHLORIDE 5 MG/1
TABLET ORAL
COMMUNITY
Start: 2025-04-14

## 2025-07-07 RX ORDER — DICLOFENAC SODIUM 10 MG/G
4 GEL TOPICAL 2 TIMES DAILY
COMMUNITY
Start: 2025-05-22

## 2025-07-07 ASSESSMENT — ENCOUNTER SYMPTOMS
CONSTITUTIONAL NEGATIVE: 1
ARTHRALGIAS: 1

## 2025-07-07 ASSESSMENT — PAIN SCALES - GENERAL: PAINLEVEL_OUTOF10: 0-NO PAIN

## 2025-07-07 NOTE — PROGRESS NOTES
"Subjective   Patient ID: Kandy Finn is a 64 y.o. female who presents for Establish Care (Pt is having stomach issues. ).    Here to establish care.     Fell and slipped an fell on floor in bathroom, 3 weeks ago on Father's Day 2025.  Has some pain over lateral sides of both knees. Normally ambulates with a cane or walker due to severe arthritis. Otherwise feels well. Has history of pre-diabetes.        Review of Systems   Constitutional: Negative.    Musculoskeletal:  Positive for arthralgias.     Objective   /85 (BP Location: Left arm, Patient Position: Sitting)   Pulse 88   Temp 36.1 °C (97 °F) (Temporal)   Ht 1.702 m (5' 7\")   Wt 125 kg (276 lb)   SpO2 93%   BMI 43.23 kg/m²     Physical Exam  Musculoskeletal:      Right knee: No swelling or deformity. Normal range of motion.      Left knee: No swelling or deformity. Normal range of motion.      Comments: Minimal tenderness along LCL of both knees. No visible swelling.      Assessment/Plan :  Screening HbA1C% was 5.9% today (pre-diabetes). Benign fall, pain should subside in the coming days. FU this fall for a complete checkup.   Diagnoses and all orders for this visit:  Prediabetes  -     POCT glycosylated hemoglobin (Hb A1C) manually resulted       "

## 2025-07-08 ENCOUNTER — HOME CARE VISIT (OUTPATIENT)
Dept: HOME HEALTH SERVICES | Facility: HOME HEALTH | Age: 65
End: 2025-07-08
Payer: COMMERCIAL

## 2025-07-08 PROCEDURE — G0151 HHCP-SERV OF PT,EA 15 MIN: HCPCS | Mod: HHH

## 2025-07-08 SDOH — ECONOMIC STABILITY: HOUSING INSECURITY: OPEN FLAME PRESENT: 1

## 2025-07-08 SDOH — HEALTH STABILITY: PHYSICAL HEALTH: EXERCISE ACTIVITY: DYNAMIC BALANCE HEP

## 2025-07-08 SDOH — HEALTH STABILITY: PHYSICAL HEALTH
EXERCISE COMMENTS: BALANCE PROGRAM ACTIVITIES INCLUDE:  SIDESTEPPING, FORWARD AND RETRO MARCHING, BACKWARD WALKING, TANDEM STANCE FOOT POSITIONS WITH ALTERNATING TRUNK ROTATIONS WITH REACHING, SINGLE LEG STANCE BALANCE ACTIVITIES

## 2025-07-08 SDOH — HEALTH STABILITY: PHYSICAL HEALTH: EXERCISE ACTIVITIES SETS: 2

## 2025-07-08 SDOH — HEALTH STABILITY: PHYSICAL HEALTH: EXERCISE TYPE: DYNAMIC BALANCE HEP

## 2025-07-08 SDOH — HEALTH STABILITY: PHYSICAL HEALTH: PHYSICAL EXERCISE: 20

## 2025-07-08 SDOH — HEALTH STABILITY: PHYSICAL HEALTH: PHYSICAL EXERCISE: STANDING

## 2025-07-08 ASSESSMENT — ENCOUNTER SYMPTOMS
DEPRESSION: 1
DENIES PAIN: 1
LIMITED RANGE OF MOTION: 1
LOSS OF SENSATION IN FEET: 0
PAIN: NO PAIN REPORTED
ARTHRALGIAS: 1
MUSCLE WEAKNESS: 1
OCCASIONAL FEELINGS OF UNSTEADINESS: 0

## 2025-07-08 ASSESSMENT — BALANCE ASSESSMENTS
STANDING UNSUPPORTED: 4
SITTING TO STANDING: 4 - ABLE TO STAND WITHOUT USING HANDS AND STABILIZE INDEPENDENTLY
REACHING FORWARD WITH OUTSTRETCHED ARM WHILE STANDING: 4 - CAN REACH FORWARD CONFIDENTLY 25 CM (10 INCHES)
TRANSFERS: 4 - ABLE TO TRANSFER SAFELY WITH MINOR USE OF HANDS
TRANSFERS: 4
LONG VERSION TOTAL SCORE (MAX 56): 52
STANDING ON ONE LEG: 3 - ABLE TO LIFT LEG INDEPENDENTLY AND HOLD 5-10 SECONDS
SITTING TO STANDING: 4
STANDING UNSUPPORTED WITH FEET TOGETHER: 4
STANDING UNSUPPORTED ONE FOOT IN FRONT: 3
COMMENTS: LOW RISK
STANDING ON ONE LEG: 3
STANDING UNSUPPORTED WITH FEET TOGETHER: 4 - ABLE TO PLACE FEET TOGETHER INDEPENDENTLY AND STAND 1 MINUTE SAFELY
STANDING TO SITTING: 4
PICK UP OBJECT FROM THE FLOOR FROM A STANDING POSITION: 4 - ABLE TO PICK UP SLIPPER SAFELY AND EASILY
STANDING UNSUPPORTED ONE FOOT IN FRONT: 3 - ABLE TO PLACE FOOT AHEAD INDEPENDENTLY AND HOLD 30 SECONDS
TURN 360 DEGREES: 3
STANDING UNSUPPORTED WITH EYES CLOSED: 4
STANDING TO SITTING: 4 - SITS SAFELY WITH MINIMAL USE OF HANDS
STANDING UNSUPPORTED WITH EYES CLOSED: 4 - ABLE TO STAND 10 SECONDS SAFELY
TURN 360 DEGREES: 3 - ABLE TO TURN 360 DEGREES SAFELY ONE SIDE ONLY 4 SECONDS OR LESS

## 2025-07-08 ASSESSMENT — ACTIVITIES OF DAILY LIVING (ADL)
OASIS_M1830: 00
CURRENT_FUNCTION: INDEPENDENT
PHYSICAL TRANSFERS ASSESSED: 1
AMBULATION ASSISTANCE: 1
AMBULATION ASSISTANCE: INDEPENDENT
HOME_HEALTH_OASIS: 00
AMBULATION_DISTANCE/DURATION_TOLERATED: 200 FEET INDEPENDENTLY WITH NO DEVICE
AMBULATION ASSISTANCE ON FLAT SURFACES: 1

## 2025-08-18 ENCOUNTER — TELEPHONE (OUTPATIENT)
Facility: HOSPITAL | Age: 65
End: 2025-08-18
Payer: COMMERCIAL

## 2025-08-20 DIAGNOSIS — R73.03 PREDIABETES: Primary | ICD-10-CM

## 2025-08-20 DIAGNOSIS — G47.33 OBSTRUCTIVE SLEEP APNEA (ADULT) (PEDIATRIC): Chronic | ICD-10-CM

## 2025-08-20 DIAGNOSIS — E66.01 MORBID OBESITY (MULTI): ICD-10-CM

## 2025-08-20 DIAGNOSIS — E78.2 MIXED HYPERLIPIDEMIA: Chronic | ICD-10-CM

## 2025-08-20 RX ORDER — SEMAGLUTIDE 0.25 MG/.5ML
0.25 INJECTION, SOLUTION SUBCUTANEOUS WEEKLY
Qty: 2 ML | Refills: 0 | Status: SHIPPED | OUTPATIENT
Start: 2025-08-20 | End: 2025-09-11

## 2025-08-22 ENCOUNTER — TELEPHONE (OUTPATIENT)
Dept: GASTROENTEROLOGY | Facility: HOSPITAL | Age: 65
End: 2025-08-22
Payer: COMMERCIAL

## 2025-08-29 ENCOUNTER — APPOINTMENT (OUTPATIENT)
Dept: SURGERY | Facility: CLINIC | Age: 65
End: 2025-08-29
Payer: COMMERCIAL

## 2025-08-29 VITALS
SYSTOLIC BLOOD PRESSURE: 120 MMHG | HEART RATE: 98 BPM | HEIGHT: 67 IN | BODY MASS INDEX: 42.85 KG/M2 | DIASTOLIC BLOOD PRESSURE: 88 MMHG | WEIGHT: 273 LBS | RESPIRATION RATE: 16 BRPM

## 2025-08-29 DIAGNOSIS — R10.33 PERIUMBILICAL ABDOMINAL PAIN: Primary | ICD-10-CM

## 2025-08-29 PROCEDURE — 1159F MED LIST DOCD IN RCRD: CPT | Performed by: STUDENT IN AN ORGANIZED HEALTH CARE EDUCATION/TRAINING PROGRAM

## 2025-08-29 PROCEDURE — 3008F BODY MASS INDEX DOCD: CPT | Performed by: STUDENT IN AN ORGANIZED HEALTH CARE EDUCATION/TRAINING PROGRAM

## 2025-08-29 PROCEDURE — 3079F DIAST BP 80-89 MM HG: CPT | Performed by: STUDENT IN AN ORGANIZED HEALTH CARE EDUCATION/TRAINING PROGRAM

## 2025-08-29 PROCEDURE — 3074F SYST BP LT 130 MM HG: CPT | Performed by: STUDENT IN AN ORGANIZED HEALTH CARE EDUCATION/TRAINING PROGRAM

## 2025-08-29 PROCEDURE — 99204 OFFICE O/P NEW MOD 45 MIN: CPT | Performed by: STUDENT IN AN ORGANIZED HEALTH CARE EDUCATION/TRAINING PROGRAM

## 2025-08-29 PROCEDURE — 1125F AMNT PAIN NOTED PAIN PRSNT: CPT | Performed by: STUDENT IN AN ORGANIZED HEALTH CARE EDUCATION/TRAINING PROGRAM

## 2025-08-29 ASSESSMENT — PAIN SCALES - GENERAL: PAINLEVEL_OUTOF10: 8

## 2025-09-05 ENCOUNTER — HOSPITAL ENCOUNTER (OUTPATIENT)
Dept: RADIOLOGY | Facility: HOSPITAL | Age: 65
Discharge: HOME | End: 2025-09-05
Payer: COMMERCIAL

## 2025-09-05 DIAGNOSIS — R10.33 PERIUMBILICAL ABDOMINAL PAIN: ICD-10-CM

## 2025-09-05 PROCEDURE — 74176 CT ABD & PELVIS W/O CONTRAST: CPT
